# Patient Record
Sex: MALE | Race: WHITE | Employment: UNEMPLOYED | URBAN - METROPOLITAN AREA
[De-identification: names, ages, dates, MRNs, and addresses within clinical notes are randomized per-mention and may not be internally consistent; named-entity substitution may affect disease eponyms.]

---

## 2023-03-06 ENCOUNTER — APPOINTMENT (OUTPATIENT)
Dept: GENERAL RADIOLOGY | Age: 88
DRG: 558 | End: 2023-03-06
Payer: MEDICARE

## 2023-03-06 ENCOUNTER — APPOINTMENT (OUTPATIENT)
Dept: CT IMAGING | Age: 88
DRG: 558 | End: 2023-03-06
Payer: MEDICARE

## 2023-03-06 ENCOUNTER — HOSPITAL ENCOUNTER (INPATIENT)
Age: 88
LOS: 4 days | Discharge: NURSING, REHAB OR LONG-TERM CARE | DRG: 558 | End: 2023-03-10
Attending: EMERGENCY MEDICINE | Admitting: FAMILY MEDICINE
Payer: MEDICARE
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
## 2023-03-06 DIAGNOSIS — M62.82 NON-TRAUMATIC RHABDOMYOLYSIS: Primary | ICD-10-CM

## 2023-03-06 DIAGNOSIS — N30.01 ACUTE CYSTITIS WITH HEMATURIA: ICD-10-CM

## 2023-03-06 DIAGNOSIS — F03.90 DEMENTIA WITHOUT BEHAVIORAL DISTURBANCE, PSYCHOTIC DISTURBANCE, MOOD DISTURBANCE, OR ANXIETY, UNSPECIFIED DEMENTIA SEVERITY, UNSPECIFIED DEMENTIA TYPE (HCC): ICD-10-CM

## 2023-03-06 DIAGNOSIS — S70.01XA CONTUSION OF RIGHT HIP, INITIAL ENCOUNTER: ICD-10-CM

## 2023-03-06 LAB
ALBUMIN SERPL-MCNC: 3.8 G/DL (ref 3.5–5.2)
ALP BLD-CCNC: 94 U/L (ref 40–129)
ALT SERPL-CCNC: 76 U/L (ref 0–40)
ANION GAP SERPL CALCULATED.3IONS-SCNC: 15 MMOL/L (ref 7–16)
AST SERPL-CCNC: 209 U/L (ref 0–39)
BACTERIA: ABNORMAL /HPF
BASOPHILS ABSOLUTE: 0.03 E9/L (ref 0–0.2)
BASOPHILS RELATIVE PERCENT: 0.2 % (ref 0–2)
BILIRUB SERPL-MCNC: 0.8 MG/DL (ref 0–1.2)
BILIRUBIN URINE: ABNORMAL
BLOOD, URINE: ABNORMAL
BUN BLDV-MCNC: 47 MG/DL (ref 6–23)
CALCIUM SERPL-MCNC: 9.5 MG/DL (ref 8.6–10.2)
CHLORIDE BLD-SCNC: 105 MMOL/L (ref 98–107)
CLARITY: CLEAR
CO2: 24 MMOL/L (ref 22–29)
COLOR: YELLOW
CREAT SERPL-MCNC: 0.9 MG/DL (ref 0.7–1.2)
EKG ATRIAL RATE: 95 BPM
EKG P AXIS: 66 DEGREES
EKG P-R INTERVAL: 152 MS
EKG Q-T INTERVAL: 364 MS
EKG QRS DURATION: 100 MS
EKG QTC CALCULATION (BAZETT): 457 MS
EKG R AXIS: 29 DEGREES
EKG T AXIS: 85 DEGREES
EKG VENTRICULAR RATE: 95 BPM
EOSINOPHILS ABSOLUTE: 0 E9/L (ref 0.05–0.5)
EOSINOPHILS RELATIVE PERCENT: 0 % (ref 0–6)
GFR SERPL CREATININE-BSD FRML MDRD: >60 ML/MIN/1.73
GLUCOSE BLD-MCNC: 121 MG/DL (ref 74–99)
GLUCOSE URINE: NEGATIVE MG/DL
HCT VFR BLD CALC: 47.8 % (ref 37–54)
HEMOGLOBIN: 16.5 G/DL (ref 12.5–16.5)
IMMATURE GRANULOCYTES #: 0.09 E9/L
IMMATURE GRANULOCYTES %: 0.6 % (ref 0–5)
KETONES, URINE: >=80 MG/DL
LEUKOCYTE ESTERASE, URINE: NEGATIVE
LYMPHOCYTES ABSOLUTE: 0.87 E9/L (ref 1.5–4)
LYMPHOCYTES RELATIVE PERCENT: 5.7 % (ref 20–42)
MCH RBC QN AUTO: 31.4 PG (ref 26–35)
MCHC RBC AUTO-ENTMCNC: 34.5 % (ref 32–34.5)
MCV RBC AUTO: 91 FL (ref 80–99.9)
MONOCYTES ABSOLUTE: 0.86 E9/L (ref 0.1–0.95)
MONOCYTES RELATIVE PERCENT: 5.7 % (ref 2–12)
NEUTROPHILS ABSOLUTE: 13.29 E9/L (ref 1.8–7.3)
NEUTROPHILS RELATIVE PERCENT: 87.8 % (ref 43–80)
NITRITE, URINE: NEGATIVE
PDW BLD-RTO: 13.2 FL (ref 11.5–15)
PH UA: 6 (ref 5–9)
PLATELET # BLD: 216 E9/L (ref 130–450)
PMV BLD AUTO: 10.8 FL (ref 7–12)
POTASSIUM SERPL-SCNC: 4.5 MMOL/L (ref 3.5–5)
PROTEIN UA: 100 MG/DL
RBC # BLD: 5.25 E12/L (ref 3.8–5.8)
RBC UA: ABNORMAL /HPF (ref 0–2)
SODIUM BLD-SCNC: 144 MMOL/L (ref 132–146)
SPECIFIC GRAVITY UA: >=1.03 (ref 1–1.03)
TOTAL CK: 4446 U/L (ref 20–200)
TOTAL PROTEIN: 6.8 G/DL (ref 6.4–8.3)
UROBILINOGEN, URINE: 0.2 E.U./DL
WBC # BLD: 15.1 E9/L (ref 4.5–11.5)
WBC UA: ABNORMAL /HPF (ref 0–5)

## 2023-03-06 PROCEDURE — 1200000000 HC SEMI PRIVATE

## 2023-03-06 PROCEDURE — 72125 CT NECK SPINE W/O DYE: CPT

## 2023-03-06 PROCEDURE — 2580000003 HC RX 258: Performed by: FAMILY MEDICINE

## 2023-03-06 PROCEDURE — 85025 COMPLETE CBC W/AUTO DIFF WBC: CPT

## 2023-03-06 PROCEDURE — 72192 CT PELVIS W/O DYE: CPT

## 2023-03-06 PROCEDURE — 71045 X-RAY EXAM CHEST 1 VIEW: CPT

## 2023-03-06 PROCEDURE — 99285 EMERGENCY DEPT VISIT HI MDM: CPT

## 2023-03-06 PROCEDURE — 80053 COMPREHEN METABOLIC PANEL: CPT

## 2023-03-06 PROCEDURE — 82550 ASSAY OF CK (CPK): CPT

## 2023-03-06 PROCEDURE — 93005 ELECTROCARDIOGRAM TRACING: CPT | Performed by: EMERGENCY MEDICINE

## 2023-03-06 PROCEDURE — 70450 CT HEAD/BRAIN W/O DYE: CPT

## 2023-03-06 PROCEDURE — 81001 URINALYSIS AUTO W/SCOPE: CPT

## 2023-03-06 PROCEDURE — 93010 ELECTROCARDIOGRAM REPORT: CPT | Performed by: INTERNAL MEDICINE

## 2023-03-06 PROCEDURE — 73502 X-RAY EXAM HIP UNI 2-3 VIEWS: CPT

## 2023-03-06 PROCEDURE — 2580000003 HC RX 258: Performed by: EMERGENCY MEDICINE

## 2023-03-06 PROCEDURE — 87088 URINE BACTERIA CULTURE: CPT

## 2023-03-06 RX ORDER — SODIUM CHLORIDE 9 MG/ML
INJECTION, SOLUTION INTRAVENOUS PRN
Status: DISCONTINUED | OUTPATIENT
Start: 2023-03-06 | End: 2023-03-10 | Stop reason: HOSPADM

## 2023-03-06 RX ORDER — MAGNESIUM HYDROXIDE/ALUMINUM HYDROXICE/SIMETHICONE 120; 1200; 1200 MG/30ML; MG/30ML; MG/30ML
30 SUSPENSION ORAL EVERY 6 HOURS PRN
Status: DISCONTINUED | OUTPATIENT
Start: 2023-03-06 | End: 2023-03-10 | Stop reason: HOSPADM

## 2023-03-06 RX ORDER — ACETAMINOPHEN 650 MG/1
650 SUPPOSITORY RECTAL EVERY 6 HOURS PRN
Status: DISCONTINUED | OUTPATIENT
Start: 2023-03-06 | End: 2023-03-10 | Stop reason: HOSPADM

## 2023-03-06 RX ORDER — ONDANSETRON 2 MG/ML
4 INJECTION INTRAMUSCULAR; INTRAVENOUS EVERY 6 HOURS PRN
Status: DISCONTINUED | OUTPATIENT
Start: 2023-03-06 | End: 2023-03-10 | Stop reason: HOSPADM

## 2023-03-06 RX ORDER — SODIUM CHLORIDE 0.9 % (FLUSH) 0.9 %
5-40 SYRINGE (ML) INJECTION PRN
Status: DISCONTINUED | OUTPATIENT
Start: 2023-03-06 | End: 2023-03-10 | Stop reason: HOSPADM

## 2023-03-06 RX ORDER — 0.9 % SODIUM CHLORIDE 0.9 %
1000 INTRAVENOUS SOLUTION INTRAVENOUS ONCE
Status: COMPLETED | OUTPATIENT
Start: 2023-03-06 | End: 2023-03-06

## 2023-03-06 RX ORDER — POLYETHYLENE GLYCOL 3350 17 G/17G
17 POWDER, FOR SOLUTION ORAL DAILY PRN
Status: DISCONTINUED | OUTPATIENT
Start: 2023-03-06 | End: 2023-03-10 | Stop reason: HOSPADM

## 2023-03-06 RX ORDER — POTASSIUM CHLORIDE 7.45 MG/ML
10 INJECTION INTRAVENOUS PRN
Status: DISCONTINUED | OUTPATIENT
Start: 2023-03-06 | End: 2023-03-10 | Stop reason: HOSPADM

## 2023-03-06 RX ORDER — ENOXAPARIN SODIUM 100 MG/ML
40 INJECTION SUBCUTANEOUS DAILY
Status: DISCONTINUED | OUTPATIENT
Start: 2023-03-06 | End: 2023-03-10 | Stop reason: HOSPADM

## 2023-03-06 RX ORDER — POTASSIUM CHLORIDE 20 MEQ/1
40 TABLET, EXTENDED RELEASE ORAL PRN
Status: DISCONTINUED | OUTPATIENT
Start: 2023-03-06 | End: 2023-03-10 | Stop reason: HOSPADM

## 2023-03-06 RX ORDER — ONDANSETRON 4 MG/1
4 TABLET, ORALLY DISINTEGRATING ORAL EVERY 8 HOURS PRN
Status: DISCONTINUED | OUTPATIENT
Start: 2023-03-06 | End: 2023-03-10 | Stop reason: HOSPADM

## 2023-03-06 RX ORDER — SODIUM CHLORIDE 9 MG/ML
INJECTION, SOLUTION INTRAVENOUS CONTINUOUS
Status: DISCONTINUED | OUTPATIENT
Start: 2023-03-06 | End: 2023-03-10 | Stop reason: HOSPADM

## 2023-03-06 RX ORDER — ACETAMINOPHEN 325 MG/1
650 TABLET ORAL EVERY 6 HOURS PRN
Status: DISCONTINUED | OUTPATIENT
Start: 2023-03-06 | End: 2023-03-10 | Stop reason: HOSPADM

## 2023-03-06 RX ORDER — SODIUM CHLORIDE 0.9 % (FLUSH) 0.9 %
5-40 SYRINGE (ML) INJECTION EVERY 12 HOURS SCHEDULED
Status: DISCONTINUED | OUTPATIENT
Start: 2023-03-06 | End: 2023-03-10 | Stop reason: HOSPADM

## 2023-03-06 RX ADMIN — SODIUM CHLORIDE 1000 ML: 9 INJECTION, SOLUTION INTRAVENOUS at 17:18

## 2023-03-06 RX ADMIN — SODIUM CHLORIDE: 9 INJECTION, SOLUTION INTRAVENOUS at 22:06

## 2023-03-06 RX ADMIN — SODIUM CHLORIDE, PRESERVATIVE FREE 10 ML: 5 INJECTION INTRAVENOUS at 22:08

## 2023-03-06 ASSESSMENT — PAIN DESCRIPTION - PAIN TYPE: TYPE: ACUTE PAIN

## 2023-03-06 ASSESSMENT — PAIN DESCRIPTION - DESCRIPTORS: DESCRIPTORS: ACHING

## 2023-03-06 ASSESSMENT — PAIN - FUNCTIONAL ASSESSMENT: PAIN_FUNCTIONAL_ASSESSMENT: 0-10

## 2023-03-06 ASSESSMENT — PAIN DESCRIPTION - FREQUENCY: FREQUENCY: CONTINUOUS

## 2023-03-06 NOTE — LETTER
[address] Medicaid  CERTIFICATION OF NECESSITY  FOR TRANSPORTATION   BY WHEELCHAIR VAN     Individual Information   1. Name: Candido Vines 2. [place] Medicaid Billing Number:    3. Address: [facility] [address]  [address] [place] [address]      Transportation Provider Information   4. Provider Name: [facility] transport   5. [place] Medicaid Provider Number:  National Provider Identifier (NPI):      Certification  7. Criteria:  By signing this document, the practitioner certifies that two statements are true:  A. This individual must be accompanied by a mobility-related assistive device from the point of pick-up to the point of drop-off. B. Transport of this individual by standard passenger vehicle or common carrier is precluded or contraindicated. 8. Period Beginning Date: 3/10/23   9. Length  [x] Not more than1 day(s)  [] One Year     Additional Information Relevant to Certification   10. Comments or Explanations, If Necessary or Appropriate   [facility] to transport      Certifying Practitioner Information   11. Name of Practitioner: Dr. Danielle Ayala   12. [place] Medicaid Provider Number, If Applicable:  [address] Provider Identifier (NPI):      Signature Information   14. Date of Signature: 3/10/23 15. Name of [facility] [address]   16. Signature and Professional Designation: Electronically signed by STAS Stein on 3/10/2023 at 11:38 AM       OD 48[address]  Rev. 7/2015          [facility] Encounter Date/Time: 3/6/2023 Philippe Braun 75 Account: [de-identified]    MRN: [id number]    Patient: Candido Vines    Contact Serial #: [id number]      ENCOUNTER          Patient Class: I Private Enc?   No Unit  BD: ZPMJ 5WE [id number]   Hospital Service: Hospitalist   Encounter DX: Rhabdomyolysis [M62.82]   ADM Provider: Katarina Ramsay MD   Procedure:     ATT Provider: Danielle Ayala MD   REF Provider:        Admission DX: Rhabdomyolysis, Acute cystitis with hematuria, Contusion of right hip, initial encounter, Fall, initial encounter, Non-traumatic rhabdomyolysis and DX codes: M62.82, N30.01, S70.01XA, W19. Anaya Ham, Y74.93      PATIENT                 Name: Angy Rosa : 1932 (89 yrs)   Address: [address] [place] [address]  [address] Sex: Male   [place] city: [address] [place] [address]         Marital Status:    Employer: UNEMPLOYED         Taoist: None   Primary Care Provider: Elyssa Harrington MD         Primary Phone: [phone]   EMERGENCY CONTACT   Contact Name Legal Guardian? Relationship to Patient Home Phone Work Phone   1. Beth Rocha  2. None,None      Brother/Sister  Other [phone]                 GUARANTOR            Guarantor: Angy Rosa     : 1932   Address: [address] Sex: Male     [place] [address]     Relation to Patient: Self       Home Phone: [phone]   Guarantor ID: [id number]       Work Phone:     Guarantor Employer: UNEMPLOYED         Status: NOT EMPLO*      COVERAGE        PRIMARY INSURANCE   Payor: [facility] MEDICARE Plan: [facility] MEDICARE ADVANT*   Payor Address: ,          Group Number: [id number] Insurance Type: [address] Name: Hanna Lutz : 1932   Subscriber ID: [id number] Pat. Rel. to Sub: Self   SECONDARY INSURANCE   Payor:   Plan:     Payor Address:  ,           Group Number:   Insurance Type:     Subscriber Name:   Subscriber :     Subscriber ID:   Pat.  Rel. to Sub:

## 2023-03-06 NOTE — ED PROVIDER NOTES
HPI:  3/6/23, Time: 1:48 PM MAYDA Villeda is a 80 y.o. male presenting to the ED for fall occurring last night around 9 PM.  Patient's sister is at bedside helping to provide history. Patient does not remember how he fell. Apparently he laid on the floor all night, and he was found by his sister around noon. He lives alone. Patient is confused on my examination, and the sister states he is at his neurologic baseline. He has no specific complaints. No recent illness or fevers. Denying any headache, neck pain, back pain, chest pain abdominal pain, nausea, vomiting, or focal numbness or weakness. States he has some right side pain which is a chronic problem. Review of Systems:  Unable to obtain complete ROS due to confusion    --------------------------------------------- PAST HISTORY ---------------------------------------------  Past Medical History:  has no past medical history on file. Past Surgical History:  has a past surgical history that includes Cardiac surgery; joint replacement; and Total hip arthroplasty. Social History:  reports that he has never smoked. He does not have any smokeless tobacco history on file. He reports that he does not drink alcohol. Family History: family history is not on file. The patients home medications have been reviewed. Allergies: Patient has no known allergies.     -------------------------------------------------- RESULTS -------------------------------------------------  All laboratory and radiology results have been personally reviewed by myself   LABS:  Results for orders placed or performed during the hospital encounter of 03/06/23   CK   Result Value Ref Range    Total CK 4,446 (H) 20 - 200 U/L   Urinalysis with Microscopic   Result Value Ref Range    Color, UA Yellow Straw/Yellow    Clarity, UA Clear Clear    Glucose, Ur Negative Negative mg/dL    Bilirubin Urine MODERATE (A) Negative    Ketones, Urine >=80 (A) Negative mg/dL Specific Gravity, UA >=1.030 1.005 - 1.030    Blood, Urine MODERATE (A) Negative    pH, UA 6.0 5.0 - 9.0    Protein,  (A) Negative mg/dL    Urobilinogen, Urine 0.2 <2.0 E.U./dL    Nitrite, Urine Negative Negative    Leukocyte Esterase, Urine Negative Negative    WBC, UA 10-20 (A) 0 - 5 /HPF    RBC, UA 1-3 0 - 2 /HPF    Bacteria, UA MODERATE (A) None Seen /HPF   CBC with Auto Differential   Result Value Ref Range    WBC 15.1 (H) 4.5 - 11.5 E9/L    RBC 5.25 3.80 - 5.80 E12/L    Hemoglobin 16.5 12.5 - 16.5 g/dL    Hematocrit 47.8 37.0 - 54.0 %    MCV 91.0 80.0 - 99.9 fL    MCH 31.4 26.0 - 35.0 pg    MCHC 34.5 32.0 - 34.5 %    RDW 13.2 11.5 - 15.0 fL    Platelets 758 581 - 712 E9/L    MPV 10.8 7.0 - 12.0 fL    Neutrophils % 87.8 (H) 43.0 - 80.0 %    Immature Granulocytes % 0.6 0.0 - 5.0 %    Lymphocytes % 5.7 (L) 20.0 - 42.0 %    Monocytes % 5.7 2.0 - 12.0 %    Eosinophils % 0.0 0.0 - 6.0 %    Basophils % 0.2 0.0 - 2.0 %    Neutrophils Absolute 13.29 (H) 1.80 - 7.30 E9/L    Immature Granulocytes # 0.09 E9/L    Lymphocytes Absolute 0.87 (L) 1.50 - 4.00 E9/L    Monocytes Absolute 0.86 0.10 - 0.95 E9/L    Eosinophils Absolute 0.00 (L) 0.05 - 0.50 E9/L    Basophils Absolute 0.03 0.00 - 0.20 E9/L   CMP   Result Value Ref Range    Sodium 144 132 - 146 mmol/L    Potassium 4.5 3.5 - 5.0 mmol/L    Chloride 105 98 - 107 mmol/L    CO2 24 22 - 29 mmol/L    Anion Gap 15 7 - 16 mmol/L    Glucose 121 (H) 74 - 99 mg/dL    BUN 47 (H) 6 - 23 mg/dL    Creatinine 0.9 0.7 - 1.2 mg/dL    Est, Glom Filt Rate >60 >=60 mL/min/1.73    Calcium 9.5 8.6 - 10.2 mg/dL    Total Protein 6.8 6.4 - 8.3 g/dL    Albumin 3.8 3.5 - 5.2 g/dL    Total Bilirubin 0.8 0.0 - 1.2 mg/dL    Alkaline Phosphatase 94 40 - 129 U/L    ALT 76 (H) 0 - 40 U/L     (H) 0 - 39 U/L   EKG 12 Lead   Result Value Ref Range    Ventricular Rate 95 BPM    Atrial Rate 95 BPM    P-R Interval 152 ms    QRS Duration 100 ms    Q-T Interval 364 ms    QTc Calculation (Bazett) 457 ms    P Axis 66 degrees    R Axis 29 degrees    T Axis 85 degrees       RADIOLOGY:  Interpreted by Radiologist.  CT HEAD WO CONTRAST   Final Result   CT HEAD WITHOUT CONTRAST:      1. No skull fracture or acute intracranial abnormality. CT CERVICAL SPINE WITHOUT CONTRAST:      1. No fracture or joint dislocation is seen. 2. Degenerative changes, as described. CT CERVICAL SPINE WO CONTRAST   Final Result   CT HEAD WITHOUT CONTRAST:      1. No skull fracture or acute intracranial abnormality. CT CERVICAL SPINE WITHOUT CONTRAST:      1. No fracture or joint dislocation is seen. 2. Degenerative changes, as described. CT PELVIS WO CONTRAST Additional Contrast? None   Final Result   1. No acute abnormality seen in the pelvis. 2. Bilateral hip arthroplasties noted. 3. Innumerable diverticula in the urinary bladder. The bladder base and   prostate are obscured by beam hardening artifact from bilateral hip   arthroplasties. 4. Prominent distal colonic diverticulosis without diverticulitis. XR CHEST PORTABLE   Final Result   No acute process. XR HIP 2-3 VW W PELVIS RIGHT   Final Result   No acute bony abnormalities or prosthetic complications.             ------------------------- NURSING NOTES AND VITALS REVIEWED ---------------------------   The nursing notes within the ED encounter and vital signs as below have been reviewed. /80   Pulse (!) 108   Temp 97.3 °F (36.3 °C)   Resp 16   Ht 6' 2\" (1.88 m)   Wt 170 lb (77.1 kg)   SpO2 98%   BMI 21.83 kg/m²   Oxygen Saturation Interpretation: Normal      ---------------------------------------------------PHYSICAL EXAM--------------------------------------      PHYSICAL EXAM:  Vitals Reviewed  Constitutional/General: Alert and oriented x2, disheveled, non toxic in NAD. HEENT: Normocephalic and atraumatic. PERRL, EOMI. Oropharynx clear, handling secretions, no trismus. No raccoon eyes. No byers's sign.    Neck: Supple, full ROM, no cervical spine tenderness. Pulmonary: Lungs clear to auscultation bilaterally, no wheezes, rales, or rhonchi. Not in respiratory distress. Cardiovascular:  Regular rate and rhythm  Chest: No chest wall tenderness. No crepitus. Abdomen: Soft, non tender, non distended. Pelvis: Pelvis stable. Tenderness to right hip. Normal range of motion to hips bilaterally. Extremities: Moves all extremities x 4. Warm and well perfused. Skin: warm and dry without rash. Neurologic: GCS 15, 5/5 strength in all extremities. Normal sensation in all extremities. Psych: Normal Affect. Normal behavior.       ------------------------------ ED COURSE/MEDICAL DECISION MAKING----------------------  Medications   0.9 % sodium chloride infusion ( IntraVENous New Bag 3/6/23 2206)   sodium chloride flush 0.9 % injection 5-40 mL (10 mLs IntraVENous Given 3/6/23 2208)   sodium chloride flush 0.9 % injection 5-40 mL (has no administration in time range)   0.9 % sodium chloride infusion (has no administration in time range)   potassium chloride (KLOR-CON M) extended release tablet 40 mEq (has no administration in time range)     Or   potassium bicarb-citric acid (EFFER-K) effervescent tablet 40 mEq (has no administration in time range)     Or   potassium chloride 10 mEq/100 mL IVPB (Peripheral Line) (has no administration in time range)   enoxaparin (LOVENOX) injection 40 mg (40 mg SubCUTAneous Not Given 3/6/23 2208)   ondansetron (ZOFRAN-ODT) disintegrating tablet 4 mg (has no administration in time range)     Or   ondansetron (ZOFRAN) injection 4 mg (has no administration in time range)   polyethylene glycol (GLYCOLAX) packet 17 g (has no administration in time range)   acetaminophen (TYLENOL) tablet 650 mg (has no administration in time range)     Or   acetaminophen (TYLENOL) suppository 650 mg (has no administration in time range)   aluminum & magnesium hydroxide-simethicone (MAALOX) 200-200-20 MG/5ML suspension 30 mL (has no administration in time range)   cefTRIAXone (ROCEPHIN) 1,000 mg in sterile water 10 mL IV syringe (has no administration in time range)   0.9 % sodium chloride bolus (0 mLs IntraVENous Stopped 3/6/23 0262)       Medical Decision Making/ED COURSE:    History From: patient, sister     Patient is a 80 y.o. male presenting to the ED for acute onset fall, moderate in severity. In the ED, patient was hemodynamically stable and afebrile. On exam, patient was alert but confused, at his neurologic baseline per sister at bedside. Labs, head CT, cervical spine CT, chest x-ray, right pelvic x-ray with right hip obtained. Differential diagnosis includes intracranial hemorrhage, spinal fracture, hip fracture, rhabdomyolysis, UTI. I reviewed and interpreted labs. CBC showed a leukocytosis of 15.1. No anemia. CMP was reassuring with normal kidney function and normal electrolytes. He had minimal elevation in LFTs but no abdominal tenderness. CPK was elevated at 4446. Urinalysis appears infected. Urine culture was sent. IV Rocephin ordered. Chest and right hip/pelvis xrays interpreted by me. Interpretation-lungs clear, no infiltrate, no hip fracture. Radiologist confirms read. Head CT, cervical spine CT, and pelvic CT showed no acute traumatic abnormalities. No intracranial hemorrhage, spinal fracture, or pelvic fractures. Patient requires admission for rhabdomyolysis and ambulatory dysfunction. CONSULTS: (Who and What was discussed)  [facility]    Social Determinants of Health : None    Chronic Conditions affecting care:    has no past medical history on file.      Medical Decision Making  Problems Addressed:  Acute cystitis with hematuria: acute illness or injury  Contusion of right hip, initial encounter: acute illness or injury  Fall, initial encounter: acute illness or injury  Non-traumatic rhabdomyolysis: acute illness or injury    Amount and/or Complexity of Data Reviewed  Independent Historian: caregiver and EMS  External Data Reviewed: ECG. Labs: ordered. Decision-making details documented in ED Course. Radiology: ordered and independent interpretation performed. Decision-making details documented in ED Course. ECG/medicine tests: ordered and independent interpretation performed. Decision-making details documented in ED Course. Risk  Decision regarding hospitalization. ED Course as of 03/06/23 2308   Mon Mar 06, 2023   1433 EKG: This EKG is signed and interpreted by Autumn silver MD.    Rate: 95  Rhythm: Sinus  Interpretation: Normal sinus rhythm, normal MA interval, normal QTc, ST depressions in anterior and lateral leads appear new  Comparison: changes compared to previous EKG   [JA]   6796 Hospitalist was consulted. [facility] accepted the patient for admission. [JA]      ED Course User Index  [JA] Autumn Barroso MD       Patient remained hemodynamically stable throughout ED course. New Prescriptions    No medications on file         Counseling: The emergency provider has spoken with the patient and sister and discussed todays results, in addition to providing specific details for the plan of care and counseling regarding the diagnosis and prognosis. Questions are answered at this time and they are agreeable with the plan.      --------------------------------- IMPRESSION AND DISPOSITION ---------------------------------    IMPRESSION  1. Non-traumatic rhabdomyolysis    2. Fall, initial encounter    3. Acute cystitis with hematuria    4. Contusion of right hip, initial encounter        DISPOSITION  Disposition: Admit to telemetry  Patient condition is stable      NOTE: This report was transcribed using voice recognition software.  Every effort was made to ensure accuracy; however, inadvertent computerized transcription errors may be present    IAutumn MD, am the primary provider of this record       Kyle Ruiz MD  03/06/23 2599

## 2023-03-07 LAB
ALBUMIN SERPL-MCNC: 3 G/DL (ref 3.5–5.2)
ALP BLD-CCNC: 73 U/L (ref 40–129)
ALT SERPL-CCNC: 71 U/L (ref 0–40)
ANION GAP SERPL CALCULATED.3IONS-SCNC: 15 MMOL/L (ref 7–16)
AST SERPL-CCNC: 171 U/L (ref 0–39)
BASOPHILS ABSOLUTE: 0.02 E9/L (ref 0–0.2)
BASOPHILS RELATIVE PERCENT: 0.2 % (ref 0–2)
BILIRUB SERPL-MCNC: 0.9 MG/DL (ref 0–1.2)
BUN BLDV-MCNC: 49 MG/DL (ref 6–23)
CALCIUM SERPL-MCNC: 8.5 MG/DL (ref 8.6–10.2)
CHLORIDE BLD-SCNC: 112 MMOL/L (ref 98–107)
CO2: 19 MMOL/L (ref 22–29)
CREAT SERPL-MCNC: 1.1 MG/DL (ref 0.7–1.2)
EOSINOPHILS ABSOLUTE: 0.02 E9/L (ref 0.05–0.5)
EOSINOPHILS RELATIVE PERCENT: 0.2 % (ref 0–6)
GFR SERPL CREATININE-BSD FRML MDRD: >60 ML/MIN/1.73
GLUCOSE BLD-MCNC: 100 MG/DL (ref 74–99)
HCT VFR BLD CALC: 44.1 % (ref 37–54)
HEMOGLOBIN: 14.7 G/DL (ref 12.5–16.5)
IMMATURE GRANULOCYTES #: 0.05 E9/L
IMMATURE GRANULOCYTES %: 0.4 % (ref 0–5)
LYMPHOCYTES ABSOLUTE: 1.08 E9/L (ref 1.5–4)
LYMPHOCYTES RELATIVE PERCENT: 9.3 % (ref 20–42)
MAGNESIUM: 2.3 MG/DL (ref 1.6–2.6)
MCH RBC QN AUTO: 31.7 PG (ref 26–35)
MCHC RBC AUTO-ENTMCNC: 33.3 % (ref 32–34.5)
MCV RBC AUTO: 95 FL (ref 80–99.9)
MONOCYTES ABSOLUTE: 0.79 E9/L (ref 0.1–0.95)
MONOCYTES RELATIVE PERCENT: 6.8 % (ref 2–12)
NEUTROPHILS ABSOLUTE: 9.71 E9/L (ref 1.8–7.3)
NEUTROPHILS RELATIVE PERCENT: 83.1 % (ref 43–80)
PDW BLD-RTO: 13.2 FL (ref 11.5–15)
PLATELET # BLD: 182 E9/L (ref 130–450)
PMV BLD AUTO: 11 FL (ref 7–12)
POTASSIUM SERPL-SCNC: 4 MMOL/L (ref 3.5–5)
RBC # BLD: 4.64 E12/L (ref 3.8–5.8)
SODIUM BLD-SCNC: 146 MMOL/L (ref 132–146)
TOTAL CK: 3625 U/L (ref 20–200)
TOTAL CK: 3872 U/L (ref 20–200)
TOTAL PROTEIN: 5.9 G/DL (ref 6.4–8.3)
TROPONIN, HIGH SENSITIVITY: 78 NG/L (ref 0–11)
WBC # BLD: 11.7 E9/L (ref 4.5–11.5)

## 2023-03-07 PROCEDURE — 6360000002 HC RX W HCPCS: Performed by: EMERGENCY MEDICINE

## 2023-03-07 PROCEDURE — 82550 ASSAY OF CK (CPK): CPT

## 2023-03-07 PROCEDURE — 2580000003 HC RX 258: Performed by: FAMILY MEDICINE

## 2023-03-07 PROCEDURE — 80053 COMPREHEN METABOLIC PANEL: CPT

## 2023-03-07 PROCEDURE — 2580000003 HC RX 258: Performed by: EMERGENCY MEDICINE

## 2023-03-07 PROCEDURE — 36415 COLL VENOUS BLD VENIPUNCTURE: CPT

## 2023-03-07 PROCEDURE — 84484 ASSAY OF TROPONIN QUANT: CPT

## 2023-03-07 PROCEDURE — 85025 COMPLETE CBC W/AUTO DIFF WBC: CPT

## 2023-03-07 PROCEDURE — 6360000002 HC RX W HCPCS: Performed by: FAMILY MEDICINE

## 2023-03-07 PROCEDURE — 83735 ASSAY OF MAGNESIUM: CPT

## 2023-03-07 PROCEDURE — 1200000000 HC SEMI PRIVATE

## 2023-03-07 PROCEDURE — 6370000000 HC RX 637 (ALT 250 FOR IP): Performed by: FAMILY MEDICINE

## 2023-03-07 RX ADMIN — CEFTRIAXONE SODIUM 1000 MG: 1 INJECTION, POWDER, FOR SOLUTION INTRAMUSCULAR; INTRAVENOUS at 00:27

## 2023-03-07 RX ADMIN — ACETAMINOPHEN 650 MG: 325 TABLET ORAL at 21:06

## 2023-03-07 RX ADMIN — ENOXAPARIN SODIUM 40 MG: 100 INJECTION SUBCUTANEOUS at 08:50

## 2023-03-07 RX ADMIN — SODIUM CHLORIDE, PRESERVATIVE FREE 10 ML: 5 INJECTION INTRAVENOUS at 11:54

## 2023-03-07 ASSESSMENT — PAIN SCALES - GENERAL: PAINLEVEL_OUTOF10: 6

## 2023-03-07 ASSESSMENT — PAIN DESCRIPTION - DESCRIPTORS: DESCRIPTORS: ACHING

## 2023-03-07 ASSESSMENT — PAIN DESCRIPTION - LOCATION: LOCATION: BACK

## 2023-03-07 NOTE — ED NOTES
Patient found by this RN with monitor leads, BP cuff and pulse ox removed and patient attempting to get out of bed. This RN and Nikia aTte pulled the patient up in bed. This RN reconnected the patient to the monitor. This patient stated \"what am I doing here? \" This RN reoriented the patient at this time  and educated him on the purpose of tracking vitals signs.      Lory Jeter RN  03/06/23 4420

## 2023-03-07 NOTE — ED NOTES
Patient checked and changed for transport to assigned bed/being cleaned.      Giulia Holley RN  03/07/23 6082

## 2023-03-07 NOTE — CARE COORDINATION
Social Work Planning:  SW met with patient and daughter Wilfred Greco at the bedside for initial assessment. Patient lives alone in a 2 story condo. Prior to admission patient was independent with ADLs. Patient has mo hx with HHC or SNF. Patient doesn't have a PCP, will need an appt prior to discharge. Malaika relayed the family plan after discharge is to move the patient to [facility], the family doesn't want the patient to know. RACHANA will await PT/OT evgene to assist with transition of care. Electronically signed by STAS Weber on 3/7/2023 at 7:31 PM

## 2023-03-07 NOTE — H&P
Hospital Medicine History & Physical      PCP: No primary care provider on file. Date of Admission: 3/6/2023    Date of Service: Pt seen/examined on 3/6/2023  and Admitted to Inpatient with expected LOS greater than two midnights due to medical therapy. Chief Complaint:  Fall       History Of Present Illness:    80 y.o. male with  no significant past medical history . Patient is seen in there ED after a fall. Patient has a history of dementia. Patient fell last night  according to sister  and laid on the floor all night  . He was found by sister at 12 noon today . Patient lives alone . He reports no fever chills chest pain nausea vomiting  shortness of breath. In the ED  patient was hemodynamically stable   Lab data  reveal sodium 144 potassium 4.5 BUN 47 SCr 0.9  CK 4446 ALT 76   WBC 15.1 HGB 16.5   UA mod bili ketones moderate blood  WBC moderate bacteria  Ct head  neg CT cervical spine no fracture  CT Pelvis neg  CXR  neg  XR hip neg  EKG NSR . Patient will be admitted for further evaluation     Past Medical History:      No past medical history on file. Past Surgical History:          Procedure Laterality Date    CARDIAC SURGERY      JOINT REPLACEMENT      TOTAL HIP ARTHROPLASTY         Medications Prior to Admission:      Prior to Admission medications    Not on File       Allergies:  Patient has no known allergies. Social History:      The patient currently lives with family     TOBACCO:   reports that he has never smoked. He does not have any smokeless tobacco history on file. ETOH:   reports no history of alcohol use. Family History:       Reviewed in detail and negative for DM, CAD, Cancer, CVA. Positive as follows:    No family history on file. REVIEW OF SYSTEMS:   Pertinent positives as noted in the HPI. All other systems reviewed and negative.     PHYSICAL EXAM:    /77   Pulse 98   Temp 97.3 °F (36.3 °C)   Resp 18   Wt 170 lb (77.1 kg)   SpO2 99% BMI 21.83 kg/m²     General appearance:  No apparent distress, appears stated age and cooperative. HEENT:  Normal cephalic, atraumatic without obvious deformity. Pupils equal, round, and reactive to light. Extra ocular muscles intact. Conjunctivae/corneas clear. Neck: Supple, with full range of motion. No jugular venous distention. Trachea midline. Respiratory:  Normal respiratory effort. Clear to auscultation, bilaterally without Rales/Wheezes/Rhonchi. Cardiovascular:  Regular rate and rhythm with normal S1/S2 without murmurs, rubs or gallops. Abdomen: Soft, non-tender, non-distended with normal bowel sounds. Musculoskeletal:  No clubbing, cyanosis or edema bilaterally. Full range of motion without deformity. Skin: Skin color, texture, turgor normal.  No rashes or lesions. Neurologic:  Neurovascularly intact without any focal sensory/motor deficits. Cranial nerves: II-XII intact, grossly non-focal.  Psychiatric:  Alert and oriented, thought content appropriate, normal insight    CXR:  I have reviewed the CXR   EKG:  I have reviewed the EKG     Labs:     Recent Labs     03/06/23  1421   WBC 15.1*   HGB 16.5   HCT 47.8        Recent Labs     03/06/23  1421      K 4.5      CO2 24   BUN 47*   CREATININE 0.9   CALCIUM 9.5     Recent Labs     03/06/23  1421   *   ALT 76*   BILITOT 0.8   ALKPHOS 94     No results for input(s): INR in the last 72 hours.   Recent Labs     03/06/23  1421   CKTOTAL 4,446*       Urinalysis:      Lab Results   Component Value Date/Time    NITRU Negative 03/06/2023 07:02 PM    WBCUA 10-20 03/06/2023 07:02 PM    BACTERIA MODERATE 03/06/2023 07:02 PM    RBCUA 1-3 03/06/2023 07:02 PM    BLOODU MODERATE 03/06/2023 07:02 PM    SPECGRAV >=1.030 03/06/2023 07:02 PM    GLUCOSEU Negative 03/06/2023 07:02 PM         ASSESSMENT:    Active Hospital Problems    Diagnosis Date Noted    Rhabdomyolysis [M62.82] 03/06/2023     Priority: Medium       PLAN:    Acute rhabdomyolysis :      patient fell at home and was ion the floor for an unknown amount of time. Patient has dementia and lives alone  CK   4446 Scr 0.9   Admit inpatient vitals telemetry trend CK IVF     Fall : CT head neg Ct cervical spine no fracture   Ct pelvis  bilateral hip arthoplasties noted  CXR neg  XR right pelvis neg . Fall precaution PT/OT     Transamintitis : ALT 76 ,   sec to rhabdo             DVT Prophylaxis: Lovenox   Diet: ADULT DIET;  Regular  Code Status: Full Code    PT/OT Eval Status: ordered     Nneka Shen MD

## 2023-03-07 NOTE — ED NOTES
Patient found undressed and questioning where he is. Patient dressed and reoriented at this time.       Duane Parekh RN  03/07/23 8057

## 2023-03-07 NOTE — ED NOTES
Attempted to call report, but placed on hold> 5 minutes. Receiving unit may contact ED after chart review if further info needed.      Osmel Casillas RN  03/07/23 2805       Osmel Casillas RN  03/07/23 6250

## 2023-03-07 NOTE — ED NOTES
Patient needing reoriented each time this RN is in the room. This patient attempted to get out of bed and this RN reoriented this patient and reminded him that he is in the hospital and that he should remain in bed and keep the monitor on so that we can track his vital signs while here.      Ken Clements RN  03/06/23 3167

## 2023-03-07 NOTE — ED NOTES
Patient found with blood on his left arm. When this RN asked what happened, the patient stated, \"Oh, I just took this thing out because I don't need it anymore\" IV found on the bed with catheter intact. This RN educated the patient on the importance of IV access and that he should not take them out. Patient cleaned up at this time. IV in right arm wrapped so patient does not do this again.      Madhuri Tanner RN  03/06/23 2040

## 2023-03-07 NOTE — ED NOTES
Patient found sitting at the edge of the bed, unsure of where he is and why and monitor leads ripped off. This RN and Saige Brandt RN pulled patient back up into bed, replaced leads, fixed blankets and reoriented patient at this time.       Madi Miller RN  03/07/23 7797

## 2023-03-08 LAB
ALBUMIN SERPL-MCNC: 2.6 G/DL (ref 3.5–5.2)
ALP BLD-CCNC: 63 U/L (ref 40–129)
ALT SERPL-CCNC: 64 U/L (ref 0–40)
ANION GAP SERPL CALCULATED.3IONS-SCNC: 9 MMOL/L (ref 7–16)
AST SERPL-CCNC: 113 U/L (ref 0–39)
BASOPHILS ABSOLUTE: 0.03 E9/L (ref 0–0.2)
BASOPHILS RELATIVE PERCENT: 0.4 % (ref 0–2)
BILIRUB SERPL-MCNC: 0.9 MG/DL (ref 0–1.2)
BUN BLDV-MCNC: 32 MG/DL (ref 6–23)
CALCIUM SERPL-MCNC: 8 MG/DL (ref 8.6–10.2)
CHLORIDE BLD-SCNC: 107 MMOL/L (ref 98–107)
CO2: 22 MMOL/L (ref 22–29)
CREAT SERPL-MCNC: 0.7 MG/DL (ref 0.7–1.2)
EOSINOPHILS ABSOLUTE: 0.17 E9/L (ref 0.05–0.5)
EOSINOPHILS RELATIVE PERCENT: 2.2 % (ref 0–6)
GFR SERPL CREATININE-BSD FRML MDRD: >60 ML/MIN/1.73
GLUCOSE BLD-MCNC: 105 MG/DL (ref 74–99)
HCT VFR BLD CALC: 41.8 % (ref 37–54)
HEMOGLOBIN: 13.7 G/DL (ref 12.5–16.5)
IMMATURE GRANULOCYTES #: 0.02 E9/L
IMMATURE GRANULOCYTES %: 0.3 % (ref 0–5)
LYMPHOCYTES ABSOLUTE: 0.98 E9/L (ref 1.5–4)
LYMPHOCYTES RELATIVE PERCENT: 12.8 % (ref 20–42)
MAGNESIUM: 2.1 MG/DL (ref 1.6–2.6)
MCH RBC QN AUTO: 31.4 PG (ref 26–35)
MCHC RBC AUTO-ENTMCNC: 32.8 % (ref 32–34.5)
MCV RBC AUTO: 95.9 FL (ref 80–99.9)
MONOCYTES ABSOLUTE: 0.48 E9/L (ref 0.1–0.95)
MONOCYTES RELATIVE PERCENT: 6.3 % (ref 2–12)
NEUTROPHILS ABSOLUTE: 5.97 E9/L (ref 1.8–7.3)
NEUTROPHILS RELATIVE PERCENT: 78 % (ref 43–80)
PDW BLD-RTO: 12.9 FL (ref 11.5–15)
PHOSPHORUS: 2.5 MG/DL (ref 2.5–4.5)
PLATELET # BLD: 153 E9/L (ref 130–450)
PMV BLD AUTO: 10.7 FL (ref 7–12)
POTASSIUM SERPL-SCNC: 3.4 MMOL/L (ref 3.5–5)
RBC # BLD: 4.36 E12/L (ref 3.8–5.8)
SODIUM BLD-SCNC: 138 MMOL/L (ref 132–146)
TOTAL CK: 1785 U/L (ref 20–200)
TOTAL PROTEIN: 5 G/DL (ref 6.4–8.3)
WBC # BLD: 7.7 E9/L (ref 4.5–11.5)

## 2023-03-08 PROCEDURE — 82550 ASSAY OF CK (CPK): CPT

## 2023-03-08 PROCEDURE — 80053 COMPREHEN METABOLIC PANEL: CPT

## 2023-03-08 PROCEDURE — 97535 SELF CARE MNGMENT TRAINING: CPT

## 2023-03-08 PROCEDURE — 2580000003 HC RX 258: Performed by: INTERNAL MEDICINE

## 2023-03-08 PROCEDURE — 97165 OT EVAL LOW COMPLEX 30 MIN: CPT

## 2023-03-08 PROCEDURE — 97530 THERAPEUTIC ACTIVITIES: CPT

## 2023-03-08 PROCEDURE — 97161 PT EVAL LOW COMPLEX 20 MIN: CPT

## 2023-03-08 PROCEDURE — 36415 COLL VENOUS BLD VENIPUNCTURE: CPT

## 2023-03-08 PROCEDURE — 83735 ASSAY OF MAGNESIUM: CPT

## 2023-03-08 PROCEDURE — 6360000002 HC RX W HCPCS: Performed by: FAMILY MEDICINE

## 2023-03-08 PROCEDURE — 2580000003 HC RX 258: Performed by: STUDENT IN AN ORGANIZED HEALTH CARE EDUCATION/TRAINING PROGRAM

## 2023-03-08 PROCEDURE — 6370000000 HC RX 637 (ALT 250 FOR IP): Performed by: STUDENT IN AN ORGANIZED HEALTH CARE EDUCATION/TRAINING PROGRAM

## 2023-03-08 PROCEDURE — 2500000003 HC RX 250 WO HCPCS: Performed by: INTERNAL MEDICINE

## 2023-03-08 PROCEDURE — 1200000000 HC SEMI PRIVATE

## 2023-03-08 PROCEDURE — 85025 COMPLETE CBC W/AUTO DIFF WBC: CPT

## 2023-03-08 PROCEDURE — 84100 ASSAY OF PHOSPHORUS: CPT

## 2023-03-08 RX ORDER — POTASSIUM CHLORIDE 20 MEQ/1
40 TABLET, EXTENDED RELEASE ORAL ONCE
Status: COMPLETED | OUTPATIENT
Start: 2023-03-08 | End: 2023-03-08

## 2023-03-08 RX ADMIN — SODIUM CHLORIDE: 9 INJECTION, SOLUTION INTRAVENOUS at 12:03

## 2023-03-08 RX ADMIN — ENOXAPARIN SODIUM 40 MG: 100 INJECTION SUBCUTANEOUS at 09:21

## 2023-03-08 RX ADMIN — POTASSIUM PHOSPHATE, MONOBASIC AND POTASSIUM PHOSPHATE, DIBASIC 20 MMOL: 224; 236 INJECTION, SOLUTION, CONCENTRATE INTRAVENOUS at 13:52

## 2023-03-08 RX ADMIN — POTASSIUM CHLORIDE 40 MEQ: 1500 TABLET, EXTENDED RELEASE ORAL at 12:01

## 2023-03-08 NOTE — PROGRESS NOTES
Internal Medicine Progress Note        Subjective  Patient seen and examined at bedside today morning. Patient resting comfortably. Patient denies any pain. Patient unable to recall reason for fall. Clinically improving. Feeling better. Stable overnight. No other overnight issues reported. No CP, SOB, palpitations, blurred vision, HA, lightheadedness, LOC or focal neurological deficits    Exam:  BP (!) 124/58   Pulse 72   Temp 97.8 °F (36.6 °C) (Temporal)   Resp 18   Ht 6' 2\" (1.88 m)   Wt 170 lb (77.1 kg)   SpO2 95%   BMI 21.83 kg/m²   General appearance: No apparent distress, appears stated age and cooperative. HEENT: Pupils equal, round, and reactive to light. Conjunctivae/corneas clear. Neck: Supple. No jugular venous distention. Trachea midline. Respiratory:  Normal respiratory effort. Clear to auscultation, bilaterally without Rales/Wheezes/Rhonchi. Cardiovascular: Regular rate and rhythm with normal S1/S2 without murmurs, rubs or gallops. Abdomen: Soft, non-tender, non-distended with normal bowel sounds. Musculoskeletal: No clubbing, cyanosis or edema bilaterally. Brisk capillary refill. 2+ lower extremity pulses (dorsalis pedis).    Skin:  No rashes    Neurologic: awake, alert and following commands     Medications:  Reviewed    Infusion Medications    sodium chloride 125 mL/hr at 03/07/23 1154    sodium chloride       Scheduled Medications    potassium chloride  40 mEq Oral Once    potassium phosphate IVPB  20 mmol IntraVENous Once    sodium chloride flush  5-40 mL IntraVENous 2 times per day    enoxaparin  40 mg SubCUTAneous Daily     PRN Meds: sodium chloride flush, sodium chloride, potassium chloride **OR** potassium alternative oral replacement **OR** potassium chloride, ondansetron **OR** ondansetron, polyethylene glycol, acetaminophen **OR** acetaminophen, aluminum & magnesium hydroxide-simethicone    I/O    Intake/Output Summary (Last 24 hours) at 3/8/2023 1104  Last data filed at 3/8/2023 0202  Gross per 24 hour   Intake 240 ml   Output 550 ml   Net -310 ml       Labs:   Recent Labs     03/06/23  1421 03/07/23  0606 03/08/23  0738   WBC 15.1* 11.7* 7.7   HGB 16.5 14.7 13.7   HCT 47.8 44.1 41.8    182 153       Recent Labs     03/06/23  1421 03/07/23  0606 03/08/23  0738    146 138   K 4.5 4.0 3.4*    112* 107   CO2 24 19* 22   BUN 47* 49* 32*   CREATININE 0.9 1.1 0.7   CALCIUM 9.5 8.5* 8.0*   PHOS  --   --  2.5       Recent Labs     03/06/23  1421 03/07/23  0606 03/08/23  0738   PROT 6.8 5.9* 5.0*   ALKPHOS 94 73 63   ALT 76* 71* 64*   * 171* 113*   BILITOT 0.8 0.9 0.9       No results for input(s): INR in the last 72 hours. Recent Labs     03/07/23  0606 03/07/23  1328 03/08/23  0738   CKTOTAL 3,872* 3,625* 1,785*       Chronic labs:  Lab Results   Component Value Date    TSH 1.630 05/18/2017       Radiology:  Imaging studies reviewed today. ASSESSMENT:  Rhabdomyolysis  Status post fall  Transaminitis    PLAN:  Continue with IV fluids at 125 cc an hour, no evidence of volume overload  CT head negative  CT cervical spine shows no fracture  CT pelvis bilateral hip arthroplasties noted  Chest x-ray negative  X-ray right pelvis negative  Fall precautions  We will trend LFTs        Diet: ADULT DIET; Regular  Code Status: Full Code  PT/OT Eval Status:     DVT Prophylaxis:   Lovenox  Recommended disposition at discharge:      +++++++++++++++++++++++++++++++++++++++++++++++++  Margarito Bundy MD   [facility].  +++++++++++++++++++++++++++++++++++++++++++++++++  NOTE: This report was transcribed using voice recognition software. Every effort was made to ensure accuracy; however, inadvertent computerized transcription errors may be present.

## 2023-03-08 NOTE — DISCHARGE INSTR - COC
Continuity of Care Form    Patient Name: Pankaj Jacome   :  1932  MRN:  [id number]    [address] [place] [address] date:  3/6/2023  Discharge date:  3/10/23    Code Status Order: Full Code   Advance Directives:     Admitting Physician:  Yas Pulido MD  PCP: Dalton Cummings MD    Discharging Nurse: [facility] Unit/Room#: 2264/4894-G  Discharging Unit Phone Number: [phone]    Emergency Contact:   Extended Emergency Contact Information  Primary Emergency Contact: [address] [facility] Phone: [phone]  Relation: Brother/Sister  Secondary Emergency Contact: None,None  Relation: Other    Past Surgical History:  Past Surgical History:   Procedure Laterality Date    CARDIAC SURGERY      JOINT REPLACEMENT      TOTAL HIP ARTHROPLASTY         Immunization History: There is no immunization history on file for this patient. Active Problems:  Patient Active Problem List   Diagnosis Code    Rhabdomyolysis M62.82       Isolation/Infection:   Isolation            No Isolation          Patient Infection Status       None to display            Nurse Assessment:  Last Vital Signs: BP (!) 124/58   Pulse 72   Temp 97.8 °F (36.6 °C) (Temporal)   Resp 18   Ht 6' 2\" (1.88 m)   Wt 170 lb (77.1 kg)   SpO2 95%   BMI 21.83 kg/m²     Last documented pain score (0-10 scale): Pain Level: 6  Last Weight:   Wt Readings from Last 1 Encounters:   23 170 lb (77.1 kg)     Mental Status:  disoriented, alert, and able to concentrate and follow conversation    IV Access:  - None    Nursing Mobility/ADLs:  Walking   Assisted  Transfer  Assisted  Bathing  Assisted  Dressing  Assisted  Toileting  Assisted  Feeding  Assisted  Med Admin  Assisted  Med Delivery   whole    Wound Care Documentation and Therapy:        Elimination:  Continence:    Bowel: Yes  Bladder: No  Urinary Catheter: None   Colostomy/Ileostomy/Ileal Conduit: No       Date of Last BM: 3/7/23 pt is unsure     Intake/Output Summary (Last 24 hours) at 3/8/2023 [address] filed at 3/8/2023 0202  Gross per 24 hour   Intake 240 ml   Output 550 ml   Net -310 ml     I/O last 3 completed shifts: In: 240 [P.O.:240]  Out: 550 [Urine:550]    Safety Concerns: At Risk for Falls    Impairments/Disabilities:      Hearing    Nutrition Therapy:  Current Nutrition Therapy:   - Oral Diet:  General    Routes of Feeding: Oral  Liquids: No Restrictions  Daily Fluid Restriction: no  Last Modified Barium Swallow with Video (Video Swallowing Test): not done    Treatments at the Time of Hospital Discharge:   Respiratory Treatments:   Oxygen Therapy:  is not on home oxygen therapy.   Ventilator:    - No ventilator support    Rehab Therapies: Physical Therapy and Occupational Therapy  Weight Bearing Status/Restrictions: No weight bearing restrictions  Other Medical Equipment (for information only, NOT a DME order):  walker and bedside commode  Other Treatments: ointment to coccyx     Patient's personal belongings (please select all that are sent with patient):  None    RN SIGNATURE:  Electronically signed by Johnny Black RN on 3/10/23 at 11:54 AM EST    CASE MANAGEMENT/SOCIAL WORK SECTION    Inpatient Status Date: 3/6/23    Readmission Risk Assessment Score:  Readmission Risk              Risk of Unplanned Readmission:  12           Discharging to Facility/ Agency   Name: [facility]  Address:  [place] [address]  Phone:  [phone]  Fax:  [phone]    Discharging to Facility/ Agency   Name: [facility]  Address:  [address] [place] [address]. Adriana Goode [address], [address]  Phone:  [phone]  Fax:    [phone]:  Wednesday [facility], 15, 2023 AT 3:20PM    / signature: Electronically signed by STAS Soto on 3/8/2023 at 1:18 PM      PHYSICIAN SECTION    Prognosis: {Prognosis:9265560603}    Condition at Discharge: [address] Patient Condition:494429748}    Rehab Potential (if transferring to Rehab): {Prognosis:5573571441}    Recommended Labs or Other Treatments After Discharge: ***    Physician Certification: I certify the above information and transfer of Marcelle Vasques  is necessary for the continuing treatment of the diagnosis listed and that he requires {Admit to Appropriate Level of Care:78424} for {GREATER/LESS:492655795} 30 days.      Update Admission H&P: {CHP DME Changes in SBAVN:172365864}    PHYSICIAN SIGNATURE:  Herson Little

## 2023-03-08 NOTE — PROGRESS NOTES
Patient refusing to let RN take VS at this time. Patient oriented to being in the hospital.  Patient states he just had VS done not that long ago and he is fine. RN explained that it is a new shift and this is what we do for all the patients. Patient continues to refuse VS.  Will attempt again shortly.

## 2023-03-08 NOTE — CARE COORDINATION
Social Work/Case Management Transition of Care Planning (Shruthi Sivakumar [place] [phone]): Per report, CT of head, cervial spine and pelvis were negative for any acute process. CXR was also negative for any acute process. CK is 3625. Per attending, plan is to trend LFTs. Patient is on room air. PT/OT pending. Discharge plane is to [facility]. Family does not want patient to be told about jail plans. Appointment scheduled for 3/15 at 3:20pm with [facility] as patient does not have a PCP. CM/SW will follow. STAS Shen  3/8/2023    Update:  Met with patient and his daughter, Montez Fernandes. Patient is oriented to self and his daughter but stated he does not remember anything else. Discussed PT score and need for DESTINY. Daughter is in agreement. Provided her with a list of [address] options. Requested she pick top 3 choices and let SW know. Daughter expressed understanding. CM/SW will follow. STAS Shen  3/8/2023      Update:  Daughter provided list of top 3 DESTINY choices 1. [facility] 2. [facility]  3. [facility]. Referral information sent to [facility]. Await response. STAS Shen  3/8/2023    Update:  Per Christina Stephens at [facility]. She does not have any male beds. Referral information sent to [facility]. Await response. STAS Shen  3/8/2023      The Plan for Transition of Care is related to the following treatment goals: DESTINY    The Patient and/or patient representative  was provided with a choice of provider and agrees with the discharge plan. [x] Yes [] No    Freedom of choice list was provided with basic dialogue that supports the patient's individualized plan of care/goals, treatment preferences and shares the quality data associated with the providers.  [x] Yes [] No

## 2023-03-08 NOTE — PROGRESS NOTES
[address]        Date:3/8/2023                                                  Patient Name: Arlene Longoria    MRN: [id number]    : 1932    Room: [address] [place] [address]      Evaluating OT: Jaswinder Garciagal,  Ru Roger Marrufo Ras OTR/L; [id number]      Referring Provider: Kala Benitez MD    Specific Provider Orders/Date: OT Eval and Treat 3/6/23      Diagnosis: Rhabdomyolysis     Surgery: none this admission    Pertinent Medical History:  has no past medical history on file.       Reason for Admission: fall at home alone, laid on floor from    Recommended Adaptive Equipment:  TBD pending progression/discharge plan     Precautions:  Fall Risk, Cognition, Bed Alarm, TAPS, Sacral Wound     Assessment of current deficits:    [x] Functional mobility  [x]ADLs  [x] Strength               [x]Cognition    [x] Functional transfers   [x] IADLs         [x] Safety Awareness   [x]Endurance    [x] Fine Coordination              [x] Balance      [] Vision/perception   []Sensation     []Gross Motor Coordination  [] ROM  [] Delirium                   [] Motor Control     OT PLAN OF CARE   OT POC based on physician orders, patient diagnosis and results of clinical assessment    Frequency/Duration: 3-5 days/wk for 2 weeks PRN   Specific OT Treatment Interventions to include:   * Instruction/training on adapted ADL techniques and AE recommendations to increase functional independence within precautions       * Training on energy conservation strategies, correct breathing pattern and techniques to improve independence/tolerance for self-care routine  * Functional transfer/mobility training/DME recommendations for increased independence, safety, and fall prevention  * Patient/Family education to increase follow through with safety techniques and functional independence  * Recommendation of environmental modifications for increased safety with functional transfers/mobility and ADLs  * Cognitive retraining/development of therapeutic activities to improve problem solving, judgement, memory, and attention for increased safety/participation in ADL/IADL tasks  * Therapeutic exercise to improve motor endurance, ROM, and functional strength for ADLs/functional transfers  * Therapeutic activities to facilitate/challenge dynamic balance, stand tolerance for increased safety and independence with ADLs    Home Living: Pt lives alone in 2 story home with level entry. Bed and bath on 1st floor. Laundry on main floor. Bathroom setup: walk in shower with shower chair   Equipment owned: none    Prior Level of Function: IND with ADLs    IND with IADLs  ambulated with no AD prior  Driving: yes - pt daughter reports limited driving to required places only  Occupation: retired (born/raised on cattle farm)    Pain Level: reported pain in R side (near ribs) and Right leg unable to stated  Cognition: A&O: 1-2/4 - oriented to self, location as hospital presented with decreased  Follows single step directions ~50% of the time - easily distracted requires redirection for safety    Memory:  fair -    Sequencing:  fair -    Problem solving:  fair -    Judgement/safety:  fair -     Functional Assessment:  AM-PAC Daily Activity Raw Score:  15/24   Initial Eval Status  Date: 3/8/23 Treatment Status  Date: STGs = LTGs  Time frame: 10-14 days   Feeding Minimal Assist   Tray set up   Independent    Grooming Minimal Assist   Moderate [place]    UB Dressing Minimal Assist   Moderate [place]    LB Dressing Dependent   [place] socks  Moderate Assist    Bathing Maximal Assist  Minimal Assist    Toileting Maximal Assist   Minimal Assist    Bed Mobility  Log Roll: Max A  Supine to sit: NT   Sit to supine: Max A   Supine to sit: Minimal Assist   Sit to supine: Minimal Assist    Functional Transfers Sit to stand:  Mod A x 2 from bedside chair   Stand to sit: Mod A x 2   Stand pivot: Mod A x 2  Commode: NT  Sit to stand:Min A   Stand to sit:Min A  Stand pivot: Min A with ww  Commode: Min A with ww    Functional Mobility Mod A x 2   within household distance  Min A with ww    Balance Sitting:     Static - SBA     Dynamic - CGA <> Min A  Standing: Mod A x 2 with ww  Sitting:  Static: Sup  Dynamic: Sup  Standing: Min A with ww   Activity Tolerance FAIR  Limited d/t overall fatigue/weakness   Decreased insight into problem solving requires repeated verbal cues for safety  GOOD   Visual/  Perceptual Glasses: yes - reading only           Vitals WFL         BUE  ROM/Strength/  Fine motor Coordination Hand dominance: Right     RUE: ROM WFL  Limited d/t pain in side     Strength: 4-/5      Strength: FAIR     Coordination:  FAIR     LUE: ROM  WFL     Strength: 4-/5      Strength: FAIR     Coordination: FAIR  increase BUE muscle strength for improved indep with functional transfers       Hearing: WFL   Sensation:  No c/o numbness or tingling   Tone: WFL   Edema: unremarkable    Patient/Family Goal: pt family goal is discharge to [facility]   Based on patient's functional performance as stated below and level of assistance needed prior to admission, this therapist believes that the patient would benefit from further skilled OT following hospital stay in an effort to increase safety, functional independence, and quality of life. Comment: Cleared by RN to see pt. Upon arrival patient seated in bedside chair and agreeable to OT session. At end of session, patient lying supine in bed with call light and phone within reach, all lines and tubes intact. Overall patient demonstrated  decreased independence and safety during completion of ADL/functional transfer/mobility tasks. Pt would benefit from continued skilled OT to increase safety and independence with completion of ADL/IADL tasks for functional independence and quality of life.     Treatment: Pt required vc's for proper technique/safety with hand placement/body mechanics/posture for bed mobility/ADLs/functional tranfers/mobility/ww management. Pt required vc's for sequencing/initiation of ADLs/functional transfers. Pt able to  sit EOB ~5 mins to increase core strength/balance/activity tolerance for ease with ADLs. Pt required increased time to complete ADLs/functional transfers due to overall fatigue/weakness and increased pain. Pt required assist of 2 for transfer from bedside chair to bed d/t increased weakness and fatigue from prolonged sitting. Pt reported  pain in R side with sit to stand transfer required cues for bilateral knee extension d/t forward flexed posture. Increased time required for problem solving provided repeated verbal cues for use of ww and stand pivot from bedside chair to EOB. Pt returned to supine d/t fatigue and required TAPs/wedges for joint/skin integrity. Pt required skilled monitoring of SpO2 during session and education on energy conservation techniques. Pt required rest breaks during session and educated on pursed lip breathing. Pt appeared to have tolerated session well and appears motivated/cooperative/pleasant. Pt instructed on use of call light for assistance and fall prevention. Pt demo'ing fair understanding of education provided. Continue to educate. Rehab Potential: Good  for established goals       LTG: maximize independence with ADLs to return to PLOF    Patient and/or family were instructed on functional diagnosis, prognosis/goals and OT plan of care. Demonstrated FAIR understanding. [] Malnutrition indicators have been identified and nursing has been notified to ensure a dietitian consult is ordered.         Eval Complexity: Low    Evaluation time includes thorough review of current medical information, gathering information on past medical & social history & PLOF, completion of standardized testing, informal observation of tasks, consultation with other medical professions/disciplines, assessment of data & development of POC/goals. Time In: 1420  Time Out: 1453  Total Treatment Time: 18    Min Units   OT Eval Low 10503  x     OT Eval Medium 37305      OT Eval High 84562      OT Re-Eval [id number]       Therapeutic Ex 30203       Therapeutic Activities 45312       ADL/Self Care 58023  18  1   Orthotic Management 18069       Manual 23107     Neuro Re-Ed 17912       Non-Billable Time          Evaluation Time additionally includes thorough review of current medical information, gathering information on past medical history/social history and prior level of function, interpretation of standardized testing/informal observation of tasks, assessment of data and development of plan of care and goals.           STEPH Matthew OTR/L; [id number]

## 2023-03-08 NOTE — PROGRESS NOTES
Internal Medicine Progress Note        Subjective  Patient seen and examined at bedside today morning. Patient resting comfortably. Patient denies any pain. Patient unable to recall reason for fall. Clinically improving. Feeling better. Stable overnight. No other overnight issues reported. No CP, SOB, palpitations, blurred vision, HA, lightheadedness, LOC or focal neurological deficits    Exam:  BP (!) 141/66   Pulse 90   Temp 97.3 °F (36.3 °C) (Temporal)   Resp 18   Ht 6' 2\" (1.88 m)   Wt 170 lb (77.1 kg)   SpO2 93%   BMI 21.83 kg/m²   General appearance: No apparent distress, appears stated age and cooperative. HEENT: Pupils equal, round, and reactive to light. Conjunctivae/corneas clear. Neck: Supple. No jugular venous distention. Trachea midline. Respiratory:  Normal respiratory effort. Clear to auscultation, bilaterally without Rales/Wheezes/Rhonchi. Cardiovascular: Regular rate and rhythm with normal S1/S2 without murmurs, rubs or gallops. Abdomen: Soft, non-tender, non-distended with normal bowel sounds. Musculoskeletal: No clubbing, cyanosis or edema bilaterally. Brisk capillary refill. 2+ lower extremity pulses (dorsalis pedis).    Skin:  No rashes    Neurologic: awake, alert and following commands     Medications:  Reviewed    Infusion Medications    sodium chloride 125 mL/hr at 03/07/23 1154    sodium chloride       Scheduled Medications    sodium chloride flush  5-40 mL IntraVENous 2 times per day    enoxaparin  40 mg SubCUTAneous Daily     PRN Meds: sodium chloride flush, sodium chloride, potassium chloride **OR** potassium alternative oral replacement **OR** potassium chloride, ondansetron **OR** ondansetron, polyethylene glycol, acetaminophen **OR** acetaminophen, aluminum & magnesium hydroxide-simethicone    I/O    Intake/Output Summary (Last 24 hours) at 3/7/2023 2051  Last data filed at 3/7/2023 1824  Gross per 24 hour   Intake 240 ml   Output 250 ml   Net -10 ml Labs:   Recent Labs     03/06/23  1421 03/07/23  0606   WBC 15.1* 11.7*   HGB 16.5 14.7   HCT 47.8 44.1    182       Recent Labs     03/06/23  1421 03/07/23  0606    146   K 4.5 4.0    112*   CO2 24 19*   BUN 47* 49*   CREATININE 0.9 1.1   CALCIUM 9.5 8.5*       Recent Labs     03/06/23  1421 03/07/23  0606   PROT 6.8 5.9*   ALKPHOS 94 73   ALT 76* 71*   * 171*   BILITOT 0.8 0.9       No results for input(s): INR in the last 72 hours. Recent Labs     03/06/23  1421 03/07/23  0606 03/07/23  1328   CKTOTAL 4,446* 3,872* 3,625*       Chronic labs:  Lab Results   Component Value Date    TSH 1.630 05/18/2017       Radiology:  Imaging studies reviewed today. ASSESSMENT:  Rhabdomyolysis  Status post fall  Transaminitis    PLAN:  Continue with IV fluids at 125 cc an hour  CT head negative  CT cervical spine shows no fracture  CT pelvis bilateral hip arthroplasties noted  Chest x-ray negative  X-ray right pelvis negative  Fall precautions  We will trend LFTs        Diet: ADULT DIET; Regular  Code Status: Full Code  PT/OT Eval Status:     DVT Prophylaxis:   Lovenox  Recommended disposition at discharge:      +++++++++++++++++++++++++++++++++++++++++++++++++  Sheldon Sommers MD   [facility].  +++++++++++++++++++++++++++++++++++++++++++++++++  NOTE: This report was transcribed using voice recognition software. Every effort was made to ensure accuracy; however, inadvertent computerized transcription errors may be present.

## 2023-03-08 NOTE — PROGRESS NOTES
Physical Therapy  Physical Therapy Initial Assessment     Name: Destin Crane  : 1932  MRN: [id number]      Date of Service: 3/8/2023    Evaluating PT:  Stevan Mayer, PT, DPT, [id number]    Room #:  3009/8071-D  Diagnosis:  Rhabdomyolysis [M62.82]  Acute cystitis with hematuria [N30.01]  Contusion of right hip, initial encounter [S70.01XA]  Fall, initial encounter [W19. XXXA]  Non-traumatic rhabdomyolysis [M62.82]  PMHx/PSHx:  No past medical history on file. Past Surgical History:   Procedure Laterality Date    CARDIAC SURGERY      JOINT REPLACEMENT      TOTAL HIP ARTHROPLASTY        Procedure/Surgery:  none this admission  Precautions:  Fall Risk,  Cognition - dementia, Bed/Chair alarm, TAPS, Sacral pressure wound  Equipment Needs:  TBD    SUBJECTIVE:    Pt lives alone in a 2 story condo with level entry. Bed is on 1 floor and bath is on 1 floor. Pt ambulated with no AD PTA. Pt owns no DME. OBJECTIVE:   Initial Evaluation  Date: 3/8/23 Treatment Short Term/ Long Term   Goals   AM-PAC 6 Clicks 61/07     Was pt agreeable to Eval/treatment? yes     Does pt have pain?  No c/o pain at rest, pain in R LB with movement     Bed Mobility  Rolling: MaxA  Supine to sit: MaxA with HOB elevated  Sit to supine: MaxA  Scooting: ModA  Rolling: Herve  Supine to sit: Herve   Sit to supine: Herve  Scooting: Herve   Transfers Sit to stand: ModA with bed elevated; MaxA from chair  ModAx2 from bedside chair - d/t fatigue (PM)  Stand to sit: ModA  Stand pivot: Herve with [address]  Sit to stand: Herve  Stand to sit: Herve  Stand pivot: Sup with [address]   Ambulation    Side stepped ~4 feet (AM/PM) at EOB with [address] Herve/ModA  25+ feet with [address] Sup   Stair negotiation: ascended and descended NT  TBD   ROM BUE:  Refer to OT note  BLE:  WFL     Strength BUE:  Refer to OT note  LLE:  4+/5  RLE: 2+/5 with MMT  Improve by 1/3 MMT   Balance Sitting EOB:  SBA>Herve  Dynamic Standing:  Herve/ModA with [address]  Sitting EOB:  Sup  Dynamic Standing:  Sup with [address] Pt is A & O x 1 to person; unable to recall place, month, or year  Sensation:  Pt denies numbness and tingling to extremities  Edema:  unremarkable    Vitals:  SPO2: 93% RA  HR: 76 bpm    Patient education  Pt educated on role of PT, importance of OOB mobility, and safety with functional mobility    Patient response to education:   Pt verbalized understanding Pt demonstrated skill Pt requires further education in this area   x With assist x     ASSESSMENT:    Conditions Requiring Skilled Therapeutic Intervention:    [x]Decreased strength     [x]Decreased ROM  [x]Decreased functional mobility  [x]Decreased balance   [x]Decreased endurance   [x]Decreased posture  []Decreased sensation  []Decreased coordination   []Decreased vision  [x]Decreased safety awareness   [x]Increased pain       Comments:  Pt was in bed upon PT entry and agreeable to participate. Pt required heavy assistance with trunk and BLE to transfer supine to sit. Pt was able to maintain fair seated balance with varying assist d/t slight posterior lean. Pt completed sit<>stand transfer (EOB x2, Chair x1) with heavy lift assist and verbal cues for hand placement. Side stepped at EOB with verbal cues for sequencing, posture, and step length, and assist for [employer] management and balance. Stand pivot transfer completed to bedside chair with [employer] with similar assist. Pt was seated in bedside chair with all needs met and call light in reach. RN notified. PT later returned to assist OT in transferring pt back to bed. Pt required increased assist for sit<>stand transfer from chair d/t fatigue. Stand pivot completed to EOB. Pt requested to stand for longer period ~5 minutes, noted L lateral lean. Pt side stepped toward [facility] with [employer] with similar assist. Pt was assisted with trunk and BLEs to return to supine. All needs were met and call light in reach at conclusion of session. OT present.     Treatment:  Patient practiced and was instructed in the following treatment:    Bed mobility training - pt given verbal and tactile cues to facilitate proper sequencing and safety during rolling and supine>sit as well as provided with physical assistance to complete task   Sitting EOB for >15 minutes for upright tolerance, postural awareness and BLE ROM  Transfer training - pt was given verbal and tactile cues to facilitate proper hand placement, technique and safety during sit to stand and stand to sit as well as provided with physical assistance. Gait training- pt was given verbal and tactile cues to facilitate balance, [facility] management/approximation, sequencing, and safety during ambulation as well as provided with physical assistance. Skilled positioning - Pt placed in the semi fowlers position with pillows utilized to facilitate upright posture, joint and skin integrity, and interaction with environment. Skilled monitoring of vitals and symptoms throughout session. Pt's/ family goals   1. To return to PLOF    Prognosis is fair for reaching above PT goals. Patient and or family understand(s) diagnosis, prognosis, and plan of care. yes    PHYSICAL THERAPY PLAN OF CARE:    PT POC is established based on physician order and patient diagnosis     Referring provider/PT Order:    03/06/23 1930  PT evaluation and treat  Start:  03/06/23 1930,   End:  03/06/23 1930,   ONE TIME,   Standing Count:  1 Occurrences,   R         Kala Benitez MD     Diagnosis:  Rhabdomyolysis [M62.82]  Acute cystitis with hematuria [N30.01]  Contusion of right hip, initial encounter [S70.01XA]  Fall, initial encounter [W19. XXXA]  Non-traumatic rhabdomyolysis [M62.82]  Specific instructions for next treatment:  improve strength, balance, and independence with functional mobility    Current Treatment Recommendations:     [x] Strengthening to improve independence with functional mobility   [x] ROM to improve independence with functional mobility   [x] Balance Training to improve static/dynamic balance and to reduce fall risk  [x] Endurance Training to improve activity tolerance during functional mobility   [x] Transfer Training to improve safety and independence with all functional transfers   [x] Gait Training to improve gait mechanics, endurance and assess need for appropriate assistive device  [x] Stair Training in preparation for safe discharge home and/or into the community   [x] Positioning to prevent skin breakdown and contractures  [x] Safety and Education Training   [x] Patient/Caregiver Education   [] HEP  [x] Other     PT long term treatment goals are located in above grid    Time in  1055  Time out  1140    Time in  1433  Time out  1448    Total Treatment Time  45 minutes     Evaluation Time includes thorough review of current medical information, gathering information on past medical history/social history and prior level of function, completion of standardized testing/informal observation of tasks, assessment of data and education on plan of care and goals.     CPT codes:  [x] Low Complexity PT evaluation 96748  [] Moderate Complexity PT evaluation 70808  [] High Complexity PT evaluation 74160  [] PT Re-evaluation 80527  [] Gait training 59664 0 minutes  [] Manual therapy 05776 0 minutes  [x] Therapeutic activities 42224 45 minutes  [] Therapeutic exercises 56971 0 minutes  [] Neuromuscular reeducation 04807 0 minutes     Terri Beltrán PT, DPT  [id number]

## 2023-03-09 LAB
ALBUMIN SERPL-MCNC: 2.9 G/DL (ref 3.5–5.2)
ALP BLD-CCNC: 79 U/L (ref 40–129)
ALT SERPL-CCNC: 71 U/L (ref 0–40)
ANION GAP SERPL CALCULATED.3IONS-SCNC: 8 MMOL/L (ref 7–16)
AST SERPL-CCNC: 81 U/L (ref 0–39)
BILIRUB SERPL-MCNC: 0.7 MG/DL (ref 0–1.2)
BUN BLDV-MCNC: 21 MG/DL (ref 6–23)
CALCIUM SERPL-MCNC: 8.6 MG/DL (ref 8.6–10.2)
CHLORIDE BLD-SCNC: 108 MMOL/L (ref 98–107)
CO2: 20 MMOL/L (ref 22–29)
CREAT SERPL-MCNC: 0.6 MG/DL (ref 0.7–1.2)
GFR SERPL CREATININE-BSD FRML MDRD: >60 ML/MIN/1.73
GLUCOSE BLD-MCNC: 103 MG/DL (ref 74–99)
MAGNESIUM: 2.1 MG/DL (ref 1.6–2.6)
PHOSPHORUS: 3 MG/DL (ref 2.5–4.5)
POTASSIUM SERPL-SCNC: 4 MMOL/L (ref 3.5–5)
SODIUM BLD-SCNC: 136 MMOL/L (ref 132–146)
TOTAL CK: 692 U/L (ref 20–200)
TOTAL PROTEIN: 5.7 G/DL (ref 6.4–8.3)
URINE CULTURE, ROUTINE: NORMAL

## 2023-03-09 PROCEDURE — 97530 THERAPEUTIC ACTIVITIES: CPT

## 2023-03-09 PROCEDURE — 84100 ASSAY OF PHOSPHORUS: CPT

## 2023-03-09 PROCEDURE — 83735 ASSAY OF MAGNESIUM: CPT

## 2023-03-09 PROCEDURE — 80053 COMPREHEN METABOLIC PANEL: CPT

## 2023-03-09 PROCEDURE — 36415 COLL VENOUS BLD VENIPUNCTURE: CPT

## 2023-03-09 PROCEDURE — 82550 ASSAY OF CK (CPK): CPT

## 2023-03-09 PROCEDURE — 97535 SELF CARE MNGMENT TRAINING: CPT

## 2023-03-09 PROCEDURE — 1200000000 HC SEMI PRIVATE

## 2023-03-09 PROCEDURE — 2580000003 HC RX 258: Performed by: STUDENT IN AN ORGANIZED HEALTH CARE EDUCATION/TRAINING PROGRAM

## 2023-03-09 PROCEDURE — 2580000003 HC RX 258: Performed by: FAMILY MEDICINE

## 2023-03-09 RX ADMIN — SODIUM CHLORIDE: 9 INJECTION, SOLUTION INTRAVENOUS at 17:21

## 2023-03-09 RX ADMIN — SODIUM CHLORIDE, PRESERVATIVE FREE 10 ML: 5 INJECTION INTRAVENOUS at 21:28

## 2023-03-09 NOTE — PROGRESS NOTES
Physical Therapy  Physical Therapy     Name: Destin Crane  : 1932  MRN: [id number]      Date of Service: 3/9/2023    Evaluating PT:  Stevan Mayer, PT, DPT, [id number]    Room #:  6299/7272-L  Diagnosis:  Rhabdomyolysis [M62.82]  Acute cystitis with hematuria [N30.01]  Contusion of right hip, initial encounter [S70.01XA]  Fall, initial encounter [W19. XXXA]  Non-traumatic rhabdomyolysis [M62.82]  PMHx/PSHx:  No past medical history on file. Past Surgical History:   Procedure Laterality Date    CARDIAC SURGERY      JOINT REPLACEMENT      TOTAL HIP ARTHROPLASTY        Procedure/Surgery:  none this admission  Precautions:  Fall Risk,  Cognition - dementia, Bed/Chair alarm, TAPS, Sacral pressure wound  Equipment Needs:  TBD    SUBJECTIVE:    Pt lives alone in a 2 story condo with level entry. Bed is on 1 floor and bath is on 1 floor. Pt ambulated with no AD PTA. Pt owns no DME. OBJECTIVE:   Initial Evaluation  Date: 3/8/23 Treatment  3/9/2023  Short Term/ Long Term   Goals   AM-PAC 6 Clicks      Was pt agreeable to Eval/treatment? yes Yes     Does pt have pain?  No c/o pain at rest, pain in R LB with movement No co pain at rest  Pain in right le with movement, back, ribs  Groin   6/10    Bed Mobility  Rolling: MaxA  Supine to sit: MaxA with HOB elevated  Sit to supine: MaxA  Scooting: ModA Nt   Hob elevated   Max assist to scoot to eob   Rolling: Herve  Supine to sit: Herve   Sit to supine: Herve  Scooting: Herve   Transfers Sit to stand: ModA with bed elevated; MaxA from chair  ModAx2 from bedside chair - d/t fatigue (PM)  Stand to sit: ModA  Stand pivot: Herve with [employer] Sit to stand mod assist  Stand to sit mod    Stand pivot - ww  mod assist    Sit to stand: Herve  Stand to sit: Herve  Stand pivot: Sup with [employer]   Ambulation    Side stepped ~4 feet (AM/PM) at EOB with [employer] Herve/ModA 48 feet ww mod assist  25+ feet with [employer] Sup   Stair negotiation: ascended and descended NT NT TBD   ROM BUE:  Refer to OT note  BLE: WFL     Strength BUE:  Refer to OT note  LLE:  4+/5  RLE: 2+/5 with MMT  Improve by 1/3 MMT   Balance Sitting EOB:  SBA>Kenneth  Dynamic Standing:  Kenneth/ModA with [employer]  Sitting EOB:  Sup  Dynamic Standing:  Sup with [employer]     Pt is A & O x 1 to person; unable to recall place, month, or year  Not able to tell me his dtrs name    Sensation:  NA  Edema:  unremarkable      Patient education  Pt educated on role of PT, importance of OOB mobility, and safety with functional mobility, seated leg ex     Patient response to education:   Pt verbalized understanding Pt demonstrated skill Pt requires further education in this area   x With assist x     ASSESSMENT:    Conditions Requiring Skilled Therapeutic Intervention:    [x]Decreased strength     [x]Decreased ROM  [x]Decreased functional mobility  [x]Decreased balance   [x]Decreased endurance   [x]Decreased posture  []Decreased sensation  []Decreased coordination   []Decreased vision  [x]Decreased safety awareness   [x]Increased pain       Comments:  Pt was in bed upon PT entry and agreeable to participate. Pt's dtr present and other friends. Had pt scoot oob due to painful ribs. Performed supine ex heel slides and small abd. Pt co groin pain with abd on his left. Pt able to maintain sitting balance. Pt performed function as per above. With gait mod assist with ww and had sba of 1 for safety. Pt left sitting up in the chair and nursing notified and family going to be sitting with pt and will watch pt. Treatment:  Patient practiced and was instructed in the following treatment:    Sitting EOB for >15 minutes for upright tolerance, postural awareness and BLE ROM  Transfer training - pt was given verbal and tactile cues to facilitate proper hand placement, technique and safety during sit to stand and stand to sit as well as provided with physical assistance.   Gait training- pt was given verbal and tactile cues to facilitate balance, [employer] management/approximation, sequencing, and safety during ambulation as well as provided with physical assistance. Pt and family education - educated on call light and chair ex to do. Laq, ankle pumps  Pt's/ family goals   1. To return to PLOF    Prognosis is fair for reaching above PT goals. Patient and or family understand(s) diagnosis, prognosis, and plan of care. yes    PHYSICAL THERAPY PLAN OF CARE:    PT POC is established based on physician order and patient diagnosis     Referring provider/PT Order:    03/06/23 1930  PT evaluation and treat  Start:  03/06/23 1930,   End:  03/06/23 1930,   ONE TIME,   Standing Count:  1 Occurrences,   R         Nancy Davis MD     Diagnosis:  Rhabdomyolysis [M62.82]  Acute cystitis with hematuria [N30.01]  Contusion of right hip, initial encounter [S70.01XA]  Fall, initial encounter [W19. XXXA]  Non-traumatic rhabdomyolysis [M62.82]  Specific instructions for next treatment:  improve strength, balance, and independence with functional mobility    Current Treatment Recommendations:     [x] Strengthening to improve independence with functional mobility   [x] ROM to improve independence with functional mobility   [x] Balance Training to improve static/dynamic balance and to reduce fall risk  [x] Endurance Training to improve activity tolerance during functional mobility   [x] Transfer Training to improve safety and independence with all functional transfers   [x] Gait Training to improve gait mechanics, endurance and assess need for appropriate assistive device  [x] Stair Training in preparation for safe discharge home and/or into the community   [x] Positioning to prevent skin breakdown and contractures  [x] Safety and Education Training   [x] Patient/Caregiver Education   [] HEP  [x] Other     PT long term treatment goals are located in above grid    Time in  230  Time out  303    Total Treatment Time  33 minutes       CPT codes:  [] Low Complexity PT evaluation 64526  [] Moderate Complexity PT evaluation [id number]  [] High Complexity PT evaluation [id number]  [] PT Re-evaluation [id number]  [] Gait training 91828 0 minutes  [] Manual therapy 90710 0 minutes  [x] Therapeutic activities 38209 45 minutes  [] Therapeutic exercises 83091 0 minutes  [] Neuromuscular reeducation 62338 0 minutes     Lissa Sandifer, PTA  [id number]

## 2023-03-09 NOTE — CARE COORDINATION
Social Work/Case Management Transition of Care Planning (Harpreet [place] [phone]): Per report, patient refused labs this morning. Labs from yesterday indicate CK is trending down. CK was 1785 on 3/8. Patient is on room air. PT/OT scores noted to be 10/15. Patient was only able to side step 4' with FWW min/mod assist.  Per Aissatou Camara at [facility], they can accept patient and will start pre-cert today. KELLIE/destination completed. 7000 started and will need completed when discharge order is entered. Discharge envelope with ambulance form is in the soft chart and will need completed day of discharge. CM/SW will follow.   STAS Christian  3/9/2023

## 2023-03-09 NOTE — CARE COORDINATION
Per Vannessa Norris from [place] [address], precert has been obtained and is good for 48 hrs. Per RN, internal med is consulting neurology for [facility]. Await input and plan. Vannessa Norris from [facility] house updated. KELLIE/destination completed. 7000 started and will need completed when discharge order is entered. Discharge envelope with ambulance form is in the soft chart and will need completed day of discharge.     aRhat Dewitt RN CM  [phone]

## 2023-03-09 NOTE — PROGRESS NOTES
Occupational Therapy  OT BEDSIDE TREATMENT NOTE   [address] [address]  [address] [place] [address]       Date:3/9/2023  Patient Name: Jing Han  MRN: [id number]  : 1932  Room: [address] [place] [address]     Per OT Eval:    Evaluating OT: To Fu, [address] OTR/L; [id number]       Referring Provider: Sommer Moran MD    Specific Provider Orders/Date: OT Eval and Treat 3/6/23       Diagnosis: Rhabdomyolysis     Surgery: none this admission    Pertinent Medical History:  has no past medical history on file.        Reason for Admission: fall at home alone, laid on floor from     Recommended Adaptive Equipment:  TBD pending progression/discharge plan      Precautions:  Fall Risk, Cognition, Bed Alarm, TAPS, Sacral Wound      Assessment of current deficits:    [x] Functional mobility            [x]ADLs           [x] Strength                  [x]Cognition    [x] Functional transfers          [x] IADLs         [x] Safety Awareness   [x]Endurance    [x] Fine Coordination                         [x] Balance      [] Vision/perception   []Sensation      []Gross Motor Coordination             [] ROM           [] Delirium                   [] Motor Control      OT PLAN OF CARE   OT POC based on physician orders, patient diagnosis and results of clinical assessment     Frequency/Duration: 3-5 days/wk for 2 weeks PRN   Specific OT Treatment Interventions to include:   * Instruction/training on adapted ADL techniques and AE recommendations to increase functional independence within precautions       * Training on energy conservation strategies, correct breathing pattern and techniques to improve independence/tolerance for self-care routine  * Functional transfer/mobility training/DME recommendations for increased independence, safety, and fall prevention  * Patient/Family education to increase follow through with safety techniques and functional independence  * Recommendation of environmental modifications for increased safety with functional transfers/mobility and ADLs  * Cognitive retraining/development of therapeutic activities to improve problem solving, judgement, memory, and attention for increased safety/participation in ADL/IADL tasks  * Therapeutic exercise to improve motor endurance, ROM, and functional strength for ADLs/functional transfers  * Therapeutic activities to facilitate/challenge dynamic balance, stand tolerance for increased safety and independence with ADLs     Home Living: Pt lives alone in 2 story home with level entry. Bed and bath on 1st floor. Laundry on main floor.     Bathroom setup: walk in shower with shower chair   Equipment owned: none     Prior Level of Function: IND with ADLs    IND with IADLs  ambulated with no AD prior  Driving: yes - pt daughter reports limited driving to required places only  Occupation: retired (born/raised on cattle farm)     Pain Level: reported pain in R side, posterior (near ribs) 6/10 with activity, 1/10 at rest     Cognition: A&O: 1-2/4 - oriented to self, cues for recall, pleasantly confused (baseline dementia)  Follows single step directions ~50% of the time - easily distracted requires redirection for safety               Memory:  fair -               Sequencing:  fair -               Problem solving:  fair -               Judgement/safety:  fair -                Functional Assessment:  AM-PAC Daily Activity Raw Score:  15/24    Initial Eval Status  Date: 3/8/23 Treatment Status  Date:   3/9/23 STGs = LTGs  Time frame: 10-14 days   Feeding Minimal Assist   Tray set up  Set up  Seated in chair   Independent    Grooming Minimal Assist  Min A  With simple tasks, seated in chair Moderate [place]    UB Dressing Minimal Assist   Min A  Doff/chencho gown seated at EOB Moderate [place]    LB Dressing Dependent   Chencho socks  Dep  Decreased ROM & strength in B LE Moderate Assist    Bathing Maximal Assist  Max A  Simulated task Minimal Assist    Toileting Maximal Assist   Dep  Using [employer] Minimal Assist    Bed Mobility  Log Roll: Max A  Supine to sit: NT   Sit to supine: Max A  NT  Seated at EOB with PTA upon arrival   Supine to sit: Minimal Assist   Sit to supine: Minimal Assist    Functional Transfers Sit to stand: Mod A x 2 from bedside chair   Stand to sit: Mod A x 2   Stand pivot: Mod A x 2  Commode: NT Mod A  Sit < > stand  EOB & chair   Sit to stand:Min A   Stand to sit:Min A  Stand pivot: Min A with ww  Commode: Min A with ww    Functional Mobility Mod A x 2   within household distance  Mod A  With walker, short household distance  Min A with ww    Balance Sitting:     Static - SBA     Dynamic - CGA <> Min A  Standing: Mod A x 2 with ww Sitting: SBA  Standing: Mod A  With walker   Sitting:  Static: Sup  Dynamic: Sup  Standing: Min A with ww   Activity Tolerance FAIR  Limited d/t overall fatigue/weakness   Decreased insight into problem solving requires repeated verbal cues for safety  Fair GOOD   Visual/  Perceptual Glasses: yes - reading only              Vitals WFL             BUE  ROM/Strength/  Fine motor Coordination Hand dominance: Right     RUE: ROM WFL  Limited d/t pain in side     Strength: 4-/5      Strength: FAIR     Coordination:  FAIR     LUE: ROM  WFL     Strength: 4-/5      Strength: FAIR     Coordination: FAIR   increase BUE muscle strength for improved indep with functional transfers        Education:  Pt was educated through out treatment regarding proper technique & safety with bed mobility, functional transfers & mobility, walker management, importance of OOB activity & ADL compensatory strategies to ease tasks, improve safety & prevent falls to return home safely.      Comments: Upon arrival pt was in bed & agreeable for therapy. At end of session pt was seated in chair, family present through out session, all lines and tubes intact, call light within reach. Requesting nsg order walker & BSC.    Pt  has made Fair progress towards set goals. Continue with current plan of care      Treatment Time In: 2:35            Treatment Time Out: 3:05           Treatment Charges: Mins Units   Ther Ex  21726     Manual Therapy [address] [facility]     Thera Activities 50308 15 1   ADL/Home Mgt 67160 15 1   Neuro Re-ed 42205     Group Therapy      Orthotic manage/training  42533     Non-Billable Time     Total Timed Treatment 30 2       Catia MOJICA  [address] [place] [address], [address] [place] [address]

## 2023-03-09 NOTE — PROGRESS NOTES
Internal Medicine Progress Note        Subjective  Patient seen and examined at bedside today morning. Patient resting comfortably. Patient did not allow lab draw today morning. Clinically improving. Feeling better. Stable overnight. No other overnight issues reported. No CP, SOB, palpitations, blurred vision, HA, lightheadedness, LOC or focal neurological deficits    Exam:  /60   Pulse 65   Temp 97.3 °F (36.3 °C) (Temporal)   Resp 16   Ht 6' 2\" (1.88 m)   Wt 183 lb 1 oz (83 kg)   SpO2 98%   BMI 23.50 kg/m²   General appearance: No apparent distress, appears stated age and cooperative. HEENT: Pupils equal, round, and reactive to light. Conjunctivae/corneas clear. Neck: Supple. No jugular venous distention. Trachea midline. Respiratory:  Lung ausculation refused   Cardiovascular: Regular rate and rhythm with normal S1/S2 without murmurs, rubs or gallops. Abdomen: Soft, non-tender, non-distended with normal bowel sounds. Musculoskeletal: No clubbing, cyanosis or edema bilaterally. Brisk capillary refill. 2+ lower extremity pulses (dorsalis pedis).    Skin:  No rashes    Neurologic: awake, alert and following commands     Medications:  Reviewed    Infusion Medications    sodium chloride 125 mL/hr at 03/08/23 1203    sodium chloride       Scheduled Medications    sodium chloride flush  5-40 mL IntraVENous 2 times per day    enoxaparin  40 mg SubCUTAneous Daily     PRN Meds: sodium chloride flush, sodium chloride, potassium chloride **OR** potassium alternative oral replacement **OR** potassium chloride, ondansetron **OR** ondansetron, polyethylene glycol, acetaminophen **OR** acetaminophen, aluminum & magnesium hydroxide-simethicone    I/O    Intake/Output Summary (Last 24 hours) at 3/9/2023 0818  Last data filed at 3/8/2023 1530  Gross per 24 hour   Intake 240 ml   Output --   Net 240 ml       Labs:   Recent Labs     03/06/23  1421 03/07/23  0606 03/08/23  0738   WBC 15.1* 11.7* 7.7 HGB 16.5 14.7 13.7   HCT 47.8 44.1 41.8    182 153       Recent Labs     03/06/23  1421 03/07/23  0606 03/08/23  0738    146 138   K 4.5 4.0 3.4*    112* 107   CO2 24 19* 22   BUN 47* 49* 32*   CREATININE 0.9 1.1 0.7   CALCIUM 9.5 8.5* 8.0*   PHOS  --   --  2.5       Recent Labs     03/06/23  1421 03/07/23  0606 03/08/23  0738   PROT 6.8 5.9* 5.0*   ALKPHOS 94 73 63   ALT 76* 71* 64*   * 171* 113*   BILITOT 0.8 0.9 0.9       No results for input(s): INR in the last 72 hours. Recent Labs     03/07/23  0606 03/07/23  1328 03/08/23  0738   CKTOTAL 3,872* 3,625* 1,785*       Chronic labs:  Lab Results   Component Value Date    TSH 1.630 05/18/2017       Radiology:  Imaging studies reviewed today. ASSESSMENT:  Rhabdomyolysis  Status post fall  Transaminitis    PLAN:  Continue with IV fluids at 125 cc an hour, no evidence of volume overload  CT head negative  CT cervical spine shows no fracture  CT pelvis bilateral hip arthroplasties noted  Chest x-ray negative  X-ray right pelvis negative  Fall precautions  We will trend LFTs    Patient refused lab draw today, attempted to convince patient to draw blood during interview, but he refused again. Will try again later. Diet: ADULT DIET; Regular  Code Status: Full Code  PT/OT Eval Status:   Participating   DVT Prophylaxis:   Lovenox  Recommended disposition at discharge:  Subacute rehab    +++++++++++++++++++++++++++++++++++++++++++++++++  Allison Mills MD   [facility].  +++++++++++++++++++++++++++++++++++++++++++++++++  NOTE: This report was transcribed using voice recognition software. Every effort was made to ensure accuracy; however, inadvertent computerized transcription errors may be present.

## 2023-03-10 ENCOUNTER — HOSPITAL ENCOUNTER (EMERGENCY)
Age: 88
Discharge: NURSING, REHAB OR LONG-TERM CARE | End: 2023-03-13
Attending: EMERGENCY MEDICINE
Payer: MEDICARE

## 2023-03-10 VITALS
OXYGEN SATURATION: 93 % | BODY MASS INDEX: 25.29 KG/M2 | RESPIRATION RATE: 16 BRPM | SYSTOLIC BLOOD PRESSURE: 125 MMHG | TEMPERATURE: 98.2 F | HEIGHT: 74 IN | DIASTOLIC BLOOD PRESSURE: 66 MMHG | HEART RATE: 77 BPM | WEIGHT: 197.1 LBS

## 2023-03-10 DIAGNOSIS — Z86.59 HISTORY OF DEMENTIA: ICD-10-CM

## 2023-03-10 DIAGNOSIS — F39 MOOD DISORDER (HCC): Primary | ICD-10-CM

## 2023-03-10 LAB
ALBUMIN SERPL-MCNC: 2.4 G/DL (ref 3.5–5.2)
ALBUMIN SERPL-MCNC: 3.4 G/DL (ref 3.5–5.2)
ALP BLD-CCNC: 67 U/L (ref 40–129)
ALP BLD-CCNC: 94 U/L (ref 40–129)
ALT SERPL-CCNC: 59 U/L (ref 0–40)
ALT SERPL-CCNC: 79 U/L (ref 0–40)
ANION GAP SERPL CALCULATED.3IONS-SCNC: 8 MMOL/L (ref 7–16)
ANION GAP SERPL CALCULATED.3IONS-SCNC: 9 MMOL/L (ref 7–16)
AST SERPL-CCNC: 54 U/L (ref 0–39)
AST SERPL-CCNC: 69 U/L (ref 0–39)
BASOPHILS ABSOLUTE: 0.03 E9/L (ref 0–0.2)
BASOPHILS RELATIVE PERCENT: 0.4 % (ref 0–2)
BILIRUB SERPL-MCNC: 0.4 MG/DL (ref 0–1.2)
BILIRUB SERPL-MCNC: 0.5 MG/DL (ref 0–1.2)
BILIRUBIN URINE: NEGATIVE
BLOOD, URINE: NEGATIVE
BUN BLDV-MCNC: 19 MG/DL (ref 6–23)
BUN BLDV-MCNC: 20 MG/DL (ref 6–23)
CALCIUM SERPL-MCNC: 7.8 MG/DL (ref 8.6–10.2)
CALCIUM SERPL-MCNC: 9.1 MG/DL (ref 8.6–10.2)
CHLORIDE BLD-SCNC: 106 MMOL/L (ref 98–107)
CHLORIDE BLD-SCNC: 109 MMOL/L (ref 98–107)
CLARITY: CLEAR
CO2: 22 MMOL/L (ref 22–29)
CO2: 26 MMOL/L (ref 22–29)
COLOR: YELLOW
CREAT SERPL-MCNC: 0.6 MG/DL (ref 0.7–1.2)
CREAT SERPL-MCNC: 0.8 MG/DL (ref 0.7–1.2)
EOSINOPHILS ABSOLUTE: 0.24 E9/L (ref 0.05–0.5)
EOSINOPHILS RELATIVE PERCENT: 2.8 % (ref 0–6)
FOLATE: 7.7 NG/ML (ref 4.8–24.2)
GFR SERPL CREATININE-BSD FRML MDRD: >60 ML/MIN/1.73
GFR SERPL CREATININE-BSD FRML MDRD: >60 ML/MIN/1.73
GLUCOSE BLD-MCNC: 113 MG/DL (ref 74–99)
GLUCOSE BLD-MCNC: 129 MG/DL (ref 74–99)
GLUCOSE URINE: NEGATIVE MG/DL
HBA1C MFR BLD: 5.8 % (ref 4–5.6)
HCT VFR BLD CALC: 46.2 % (ref 37–54)
HEMOGLOBIN: 15.3 G/DL (ref 12.5–16.5)
IMMATURE GRANULOCYTES #: 0.08 E9/L
IMMATURE GRANULOCYTES %: 0.9 % (ref 0–5)
KETONES, URINE: NEGATIVE MG/DL
LEUKOCYTE ESTERASE, URINE: NEGATIVE
LYMPHOCYTES ABSOLUTE: 1.14 E9/L (ref 1.5–4)
LYMPHOCYTES RELATIVE PERCENT: 13.4 % (ref 20–42)
MAGNESIUM: 1.9 MG/DL (ref 1.6–2.6)
MCH RBC QN AUTO: 31.5 PG (ref 26–35)
MCHC RBC AUTO-ENTMCNC: 33.1 % (ref 32–34.5)
MCV RBC AUTO: 95.3 FL (ref 80–99.9)
MONOCYTES ABSOLUTE: 0.74 E9/L (ref 0.1–0.95)
MONOCYTES RELATIVE PERCENT: 8.7 % (ref 2–12)
NEUTROPHILS ABSOLUTE: 6.3 E9/L (ref 1.8–7.3)
NEUTROPHILS RELATIVE PERCENT: 73.8 % (ref 43–80)
NITRITE, URINE: NEGATIVE
PDW BLD-RTO: 13 FL (ref 11.5–15)
PH UA: 6 (ref 5–9)
PHOSPHORUS: 3 MG/DL (ref 2.5–4.5)
PLATELET # BLD: 211 E9/L (ref 130–450)
PMV BLD AUTO: 10.9 FL (ref 7–12)
POTASSIUM SERPL-SCNC: 3.9 MMOL/L (ref 3.5–5)
POTASSIUM SERPL-SCNC: 4.3 MMOL/L (ref 3.5–5)
PROTEIN UA: NEGATIVE MG/DL
RBC # BLD: 4.85 E12/L (ref 3.8–5.8)
SODIUM BLD-SCNC: 139 MMOL/L (ref 132–146)
SODIUM BLD-SCNC: 141 MMOL/L (ref 132–146)
SPECIFIC GRAVITY UA: 1.02 (ref 1–1.03)
TOTAL CK: 334 U/L (ref 20–200)
TOTAL PROTEIN: 4.7 G/DL (ref 6.4–8.3)
TOTAL PROTEIN: 6.5 G/DL (ref 6.4–8.3)
TSH SERPL DL<=0.05 MIU/L-ACNC: 2.86 UIU/ML (ref 0.27–4.2)
UROBILINOGEN, URINE: 0.2 E.U./DL
VITAMIN B-12: 610 PG/ML (ref 211–946)
WBC # BLD: 8.5 E9/L (ref 4.5–11.5)

## 2023-03-10 PROCEDURE — 36415 COLL VENOUS BLD VENIPUNCTURE: CPT

## 2023-03-10 PROCEDURE — 99222 1ST HOSP IP/OBS MODERATE 55: CPT | Performed by: PSYCHIATRY & NEUROLOGY

## 2023-03-10 PROCEDURE — 82746 ASSAY OF FOLIC ACID SERUM: CPT

## 2023-03-10 PROCEDURE — 84165 PROTEIN E-PHORESIS SERUM: CPT

## 2023-03-10 PROCEDURE — 80053 COMPREHEN METABOLIC PANEL: CPT

## 2023-03-10 PROCEDURE — 83735 ASSAY OF MAGNESIUM: CPT

## 2023-03-10 PROCEDURE — 83036 HEMOGLOBIN GLYCOSYLATED A1C: CPT

## 2023-03-10 PROCEDURE — 97530 THERAPEUTIC ACTIVITIES: CPT

## 2023-03-10 PROCEDURE — 97161 PT EVAL LOW COMPLEX 20 MIN: CPT

## 2023-03-10 PROCEDURE — 93005 ELECTROCARDIOGRAM TRACING: CPT | Performed by: EMERGENCY MEDICINE

## 2023-03-10 PROCEDURE — 81003 URINALYSIS AUTO W/O SCOPE: CPT

## 2023-03-10 PROCEDURE — 86334 IMMUNOFIX E-PHORESIS SERUM: CPT

## 2023-03-10 PROCEDURE — 82607 VITAMIN B-12: CPT

## 2023-03-10 PROCEDURE — 96372 THER/PROPH/DIAG INJ SC/IM: CPT

## 2023-03-10 PROCEDURE — 85025 COMPLETE CBC W/AUTO DIFF WBC: CPT

## 2023-03-10 PROCEDURE — 2580000003 HC RX 258: Performed by: STUDENT IN AN ORGANIZED HEALTH CARE EDUCATION/TRAINING PROGRAM

## 2023-03-10 PROCEDURE — 99285 EMERGENCY DEPT VISIT HI MDM: CPT

## 2023-03-10 PROCEDURE — 84443 ASSAY THYROID STIM HORMONE: CPT

## 2023-03-10 PROCEDURE — 82550 ASSAY OF CK (CPK): CPT

## 2023-03-10 PROCEDURE — 84100 ASSAY OF PHOSPHORUS: CPT

## 2023-03-10 PROCEDURE — 80307 DRUG TEST PRSMV CHEM ANLYZR: CPT

## 2023-03-10 RX ADMIN — SODIUM CHLORIDE: 9 INJECTION, SOLUTION INTRAVENOUS at 05:40

## 2023-03-10 ASSESSMENT — PAIN - FUNCTIONAL ASSESSMENT: PAIN_FUNCTIONAL_ASSESSMENT: NONE - DENIES PAIN

## 2023-03-10 ASSESSMENT — PAIN DESCRIPTION - LOCATION: LOCATION: BACK

## 2023-03-10 NOTE — CARE COORDINATION
Social Work/Case Management Transition of Care Planning ([facility]ile[facility] [phone]): Per report, family requested neurology consult due to AMS. Neurology indicated patient is ok for discharge. Discharge order noted. Pre-cert was obtained 3/9.  7000 completed. Notified Yang Da Silva of [facility]. Per PT, patient did better with therapy today and is safe to transport via ambulette. Await response from Yang Da Silva regarding facilities ability to transport. Discharge envelope is in the soft chart. STAS Segura  3/10/2023    Update:  Per Yang Da Silva, they can provide transport. Plan is for patient to be picked up at 2:00pm.  Met with patient and his daughter, Amrit John, at bedside to update them. Also notified floor nurse.   STAS Segura  3/10/2023

## 2023-03-10 NOTE — PROGRESS NOTES
Physical Therapy  Treatment    Name: Love Garrison  : 1932  MRN: [id number]      Date of Service: 3/10/2023    Evaluating PT:  Samaria Strong PT, DPT, [id number]    Room #:  3472/0816-X  Diagnosis:  Rhabdomyolysis [M62.82]  Acute cystitis with hematuria [N30.01]  Contusion of right hip, initial encounter [S70.01XA]  Fall, initial encounter [W19. XXXA]  Non-traumatic rhabdomyolysis [M62.82]  PMHx/PSHx:  No past medical history on file. Past Surgical History:   Procedure Laterality Date    CARDIAC SURGERY      JOINT REPLACEMENT      TOTAL HIP ARTHROPLASTY        Procedure/Surgery:  none this admission  Precautions:  Fall Risk,  Cognition - dementia, Bed/Chair alarm, TAPS, Sacral pressure wound, External Male Catheter   Equipment Needs:  TBD    SUBJECTIVE:    Pt lives alone in a 2 story condo with level entry. Bed is on 1 floor and bath is on 1 floor. Pt ambulated with no AD PTA. Pt owns no DME. OBJECTIVE:   Initial Evaluation  Date: 3/8/23 Treatment  3/10/2023  Short Term/ Long Term   Goals   AM-PAC 6 Clicks 43/51     Was pt agreeable to Eval/treatment? yes Yes     Does pt have pain?  No c/o pain at rest, pain in R LB with movement 5/10 R side pain    Bed Mobility  Rolling: MaxA  Supine to sit: MaxA with HOB elevated  Sit to supine: MaxA  Scooting: ModA Rolling: MaxA  Supine to sit: MaxA   Sit to supine: NT  Scooting: Kenneth   Rolling: Kenneth  Supine to sit: Kenneth   Sit to supine: Kenneth  Scooting: Kenneth   Transfers Sit to stand: ModA with bed elevated; MaxA from chair  ModAx2 from bedside chair - d/t fatigue (PM)  Stand to sit: ModA  Stand pivot: Kenneth with [employer] Sit to stand: Kenneth - bed; Kenneth/ModA from chair  Stand to sit: Kenneth  Stand pivot: Kenneth with [employer]  Sit to stand: Kenneth  Stand to sit: Kenneth  Stand pivot: Sup with [employer]   Ambulation    Side stepped ~4 feet (AM/PM) at EOB with [employer] Kenneth/ModA 120' with [employer] Kenneth 25+ feet with [employer] Sup   Stair negotiation: ascended and descended NT NT TBD   ROM BUE:  Refer to OT note  BLE:  WFL     Strength BUE:  Refer to OT note  LLE:  4+/5  RLE: 2+/5 with MMT  Improve by 1/3 MMT   Balance Sitting EOB:  SBA>Kenneth  Dynamic Standing:  Kenneth/ModA with [employer] Sitting EOB:  SBA>CGA  Dynamic Standing:  Kenneth with [employer] Sitting EOB:  Sup  Dynamic Standing:  Sup with [employer]   Pt is A & O x 1 to person  Edema: unremarkable     Vitals:  Blood Pressure at rest -- Blood Pressure post session --   Heart Rate at rest 74 bpm Heart Rate post session 90 bpm   SPO2 at rest 99% RA SPO2 post session 99% RA     Patient education  Pt educated on role of PT and safety with functional mobility    Patient response to education:   Pt verbalized understanding Pt demonstrated skill Pt requires further education in this area   x x x     ASSESSMENT:    Comments:    Pt was in bed upon PT entry and agreeable to participate. Pt required increased time to complete bed mobility d/t pain in R side. Pt required heavy assist and verbal cues for sequencing to transfer to EOB. Pt completed sit<>stand transfers throughout session (EOB x1, Chair x4) with verbal cues for hand placement and  increased assistance as pt became fatigued. Ambulated in hallway with [employer] with crouched and unsteady gait with quick pace. Pt given steadying assist and verbal cues for [employer] approximation, upright posture, and  slowed pace for safety. Pt was seated in bedside chair with all needs met and call light in reach at conclusion of session. RN notified.     Treatment:  Patient practiced and was instructed in the following treatment:    Bed mobility training - pt given verbal and tactile cues to facilitate proper sequencing and safety during rolling and supine>sit as well as provided with physical assistance to complete task   Sitting EOB for >5 minutes for upright tolerance, postural awareness and BLE ROM  Transfer training - pt was given verbal and tactile cues to facilitate proper hand placement, technique and safety during sit to stand and stand to sit as well as provided with physical assistance. Gait training- pt was given verbal and tactile cues to facilitate balance, posture, WW approximation, and safety during ambulation as well as provided with physical assistance. Skilled positioning - Pt placed in the chair position with pillows utilized to facilitate upright posture, joint and skin integrity, and interaction with environment. Skilled monitoring of vitals throughout session. PLAN:    Patient is making fair progress towards established goals. Will continue with current POC.       Time in  0835  Time out  0915    Total Treatment Time  40 minutes     CPT codes:  [] Gait training 55650 0 minutes  [] Manual therapy 59970 0 minutes  [x] Therapeutic activities 97267 40 minutes  [] Therapeutic exercises 22292 0 minutes  [] Neuromuscular reeducation 92607 0 minutes    Rubi Alvarado PT, DPT  [id number]

## 2023-03-10 NOTE — PLAN OF CARE
Problem: Skin/Tissue Integrity  Goal: Absence of new skin breakdown  Description: 1. Monitor for areas of redness and/or skin breakdown  2. Assess vascular access sites hourly  3. Every 4-6 hours minimum:  Change oxygen saturation probe site  4. Every 4-6 hours:  If on nasal continuous positive airway pressure, respiratory therapy assess nares and determine need for appliance change or resting period.   3/9/2023 2255 by Sonu Franklin RN  Outcome: Progressing     Problem: Safety - Adult  Goal: Free from fall injury  3/9/2023 2255 by Sonu Franklin RN  Outcome: Progressing     Problem: ABCDS Injury Assessment  Goal: Absence of physical injury  3/9/2023 2255 by Sonu Franklin RN  Outcome: Progressing

## 2023-03-10 NOTE — CONSULTS
[facility]  Neurology Consult    Date:  3/10/2023  Patient Name:  Pankaj Jacome  YOB: 1932  MRN: [id number]     PCP:  Dalton Cummings MD   Referring:  No ref. provider found      Chief Complaint: AMS, Fall    History obtained from: Chart and patient    Assessment & Plan  Pankaj Jacome is a 80 y.o. male with a PMHx of dementia who initially presented 03/06/23 for a fall developed a change in mental status. On presentation was noted to have WBC 15.1, CK of 4446,  and subsequently admitted for rhabdomyolysis. Imaging workup negative for acute fractures in the skull, c spine, or pelvis. 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 As far as the acute changes in his mental status noted by family, they seem to be in line with delirium in a hospitalized elderly patient. Delirium   Plan:     1. Reorientation    2. Maintain normal sleep-wake cycle    Falls 2/2 ?neuropathy of unknown etiology    Plan:     1. Follow A1c, TSH, B12 and Folate    2. MRI Brain as outpatient    3. Follow up at neurology office in a couple months     4. D/c to [facility]        History of Present Illness:  Pankaj Jacome is a 80 y.o. right handed male with a history of dementia that initially presented for evaluation of a fall that occurred at approximately 2100 on 03/06/2022 who now is displaying changes in his mental status. Per his daughter, the patient was at his \"normal\" neurological baseline on presentation. He has been saying weird things (stories related to memories of his past work in [employer]), and is only oriented to self. His daughter states that he would normally be able to say what month and year it is. The change in his mental status began two days ago, and has gradually been improving. The patient has a questionable history of falls.  He lives alone and his sister would check on him weekly. On interview the patient states his R hip hurts. He mentions that many of the men in his family suffered from \"memory problems'. The patient has moderate neurocognitive deficits secondary to his preexisting dementia. The stories sound like reiteration of old memories. The patient's physical and neurological exam are notable for decreased sensation from the knees down bilaterally. Prior to this presentation the  patient was living on his own, driving on his own, and had no issues unless there was a detour. The family present state that he is doing better than he was two days ago. The patient is agreeable to being discharged to a facility. The family is hoping to use this as a bridge to get the patient into an assisted living facility, which is appropriate given the situation. Review of Systems:    Complete 10-point review of systems is negative except as noted above in my HPI      Medical History:   No past medical history on file. Surgical History:   Past Surgical History:   Procedure Laterality Date    CARDIAC SURGERY      JOINT REPLACEMENT      TOTAL HIP ARTHROPLASTY          Family History:   No family history on file.     Social History:  Social History     Tobacco Use    Smoking status: Never   Substance Use Topics    Alcohol use: No        Current Medications:      Current Facility-Administered Medications   Medication Dose Route Frequency Provider Last Rate Last Admin    white petrolatum ointment   Topical BID PRN Sheldon Sommers MD        0.9 % sodium chloride infusion   IntraVENous Continuous Sheldon Sommers MD 75 mL/hr at 03/10/23 0540 New Bag at 03/10/23 0540    sodium chloride flush 0.9 % injection 5-40 mL  5-40 mL IntraVENous 2 times per day Macey Ballesteros MD   10 mL at 03/09/23 4090    sodium chloride flush 0.9 % injection 5-40 mL  5-40 mL IntraVENous PRN Macey Ballesteros MD        0.9 % sodium chloride infusion   IntraVENous PRN Macey Ballesteros MD        potassium chloride (KLOR-CON M) extended release tablet 40 mEq  40 mEq Oral PRN Oliverio Friedman MD        Or    potassium bicarb-citric acid (EFFER-K) effervescent tablet 40 mEq  40 mEq Oral PRN Oliverio Friedman MD        Or    potassium chloride 10 mEq/100 mL IVPB (Peripheral Line)  10 mEq IntraVENous PRN Oliverio Friedman MD        enoxaparin (LOVENOX) injection 40 mg  40 mg SubCUTAneous Daily Oliverio Friedman MD   40 mg at 03/08/23 0921    ondansetron (ZOFRAN-ODT) disintegrating tablet 4 mg  4 mg Oral Q8H PRN Oliverio Friedman MD        Or    ondansetron [facility]) injection 4 mg  4 mg IntraVENous Q6H PRN Oliverio Friedman MD        polyethylene glycol (GLYCOLAX) packet 17 g  17 g Oral Daily PRN Oliverio Friedman MD        acetaminophen (TYLENOL) tablet 650 mg  650 mg Oral Q6H PRN Oliverio Friedman MD   650 mg at 03/07/23 2106    Or    acetaminophen (TYLENOL) suppository 650 mg  650 mg Rectal Q6H PRN Oliverio Friedman MD        aluminum & magnesium hydroxide-simethicone (MAALOX) 200-200-20 MG/5ML suspension 30 mL  30 mL Oral Q6H PRN Oliverio Friedman MD            Allergies:      No Known Allergies     Physical Examination  Vitals   Vitals:    03/09/23 0011 03/09/23 0745 03/09/23 2000 03/10/23 0530   BP:  138/60 121/73    Pulse:  65 80    Resp:  16 16    Temp:  97.3 °F (36.3 °C) 97.7 °F (36.5 °C)    TempSrc:  Temporal Temporal    SpO2:  98% 97%    Weight: 183 lb 1 oz (83 kg)   197 lb 1.6 oz (89.4 kg)   Height:            General: Patient appears in no acute distress. HEENT: Normocephalic, atraumatic  Chest: Clear to auscultation bilaterally  Heart: No murmurs appreciated  Extremities/Peripheral vascular: No edema/swelling noted. Neurologic Examination    Mental Status  Alert, and oriented to person, but not place [facility]), or time (Month and year) Speech is fluent with intact comprehension. Evidence of memory impairment due to preexisting neurocognitive dysfunction. Attention and concentration appeared abnormal.     Cranial Nerves  II.  Visual fields full to confrontation bilaterally. III, IV, VI: Pupils equally round and reactive to light, 3 to 2 mm bilaterally. EOMs: full, no nystagmus. V. Facial sensation intact to light touch bilaterally  VII: Facial movements symmetric and strong  VIII: Hearing intact to voice  IX,X: Palate elevates symmetrically. No dysarthria  XI:  trapezius 5/5 bilaterally   XII: Tongue is midline    Motor     Right Left   Right Left   Deltoid 5 5  Hip Flexion     Biceps 5 5  Knee Extension     Triceps 5 5  Knee Flexion     Handgrip 5 5  Ankle Dorsiflexion         Ankle Plantarflexion       Tone: Normal in all four limbs    Sensation  Light Touch: diminished from knees down bilaterally  Pinprick: diminished from ankle down on R, From just below the ankle down on the L  Vibration: diminished from knees down bilaterally         Coordination    Finger to nose testing normal bilaterally      Gait    Deferred for safety/fall consideration      Labs  Recent Labs     03/08/23  0738 03/09/23  1057 03/10/23  0436      < > 139   K 3.4*   < > 3.9      < > 109*   CO2 22   < > 22   BUN 32*   < > 19   CREATININE 0.7   < > 0.6*   GLUCOSE 105*   < > 113*   CALCIUM 8.0*   < > 7.8*   PROT 5.0*   < > 4.7*   LABALBU 2.6*   < > 2.4*   BILITOT 0.9   < > 0.4   ALKPHOS 63   < > 67   *   < > 54*   ALT 64*   < > 59*   WBC 7.7  --   --    RBC 4.36  --   --    HGB 13.7  --   --    HCT 41.8  --   --    MCV 95.9  --   --    MCH 31.4  --   --    MCHC 32.8  --   --    RDW 12.9  --   --      --   --    MPV 10.7  --   --     < > = values in this interval not displayed. Imaging  CT HEAD WO CONTRAST   Final Result   CT HEAD WITHOUT CONTRAST:      1. No skull fracture or acute intracranial abnormality. CT CERVICAL SPINE WITHOUT CONTRAST:      1. No fracture or joint dislocation is seen. 2. Degenerative changes, as described. CT CERVICAL SPINE WO CONTRAST   Final Result   CT HEAD WITHOUT CONTRAST:      1.   No skull fracture or acute intracranial abnormality. CT CERVICAL SPINE WITHOUT CONTRAST:      1. No fracture or joint dislocation is seen. 2. Degenerative changes, as described. CT PELVIS WO CONTRAST Additional Contrast? None   Final Result   1. No acute abnormality seen in the pelvis. 2. Bilateral hip arthroplasties noted. 3. Innumerable diverticula in the urinary bladder. The bladder base and   prostate are obscured by beam hardening artifact from bilateral hip   arthroplasties. 4. Prominent distal colonic diverticulosis without diverticulitis. XR CHEST PORTABLE   Final Result   No acute process. XR HIP 2-3 VW W PELVIS RIGHT   Final Result   No acute bony abnormalities or prosthetic complications.                I have personally reviewed the following images: CT Head, CT C spine           Jn Morillo MS3  Attending Physician: Dr. David Pimentel

## 2023-03-10 NOTE — DISCHARGE SUMMARY
Internal Medicine Discharge Summary    Patient ID: Tobin Noble      Patient's PCP: Amelia Ro MD    Admit Date: 3/6/2023     Discharge Date:   03/10/2023    Admitting Physician: Mary Mckeon MD     Discharge Physician: Abena Medrano MD     Discharge Diagnoses:  Rhabdomyolysis  Status post fall  Transaminitis  Altered mental status         The patient was seen and examined on day of discharge and this discharge summary is in conjunction with any daily progress note from day of discharge. Hospital Course:   60-year-old male with suspected dementia presented to the ER status post fall. Patient fell overnight and was on the ground for up to about 12 hours. Patient was found at 12 PM noon on the floor. Patient lives alone. Patient denied having any chills, chest pain, nausea, vomiting, shortness of breath on presentation. In the ER patient is found to be hemodynamically stable. Patient's CK was elevated at 4446. And also had mild transaminitis. CT head was found to be negative, CT cervical spine did not show any fracture, CT pelvis bilateral hip arthroplasties were noted. Chest x-ray was found to be negative. X-ray right pelvis was found to be negative. Patient was started on out of the lines of fluids. CK was found to be downtrending over his course. Allergy was consulted due to slight confusion as per family. Patient was cleared by neurology. Patient otherwise participated in physical therapy to regain his strength. Patient is clinically stable for discharge. Exam:     /66   Pulse 77   Temp 98.2 °F (36.8 °C) (Temporal)   Resp 16   Ht 6' 2\" (1.88 m)   Wt 197 lb 1.6 oz (89.4 kg)   SpO2 93%   BMI 25.31 kg/m²     General appearance: No apparent distress, appears stated age and cooperative. HEENT: Pupils equal, round, and reactive to light. Conjunctivae/corneas clear. Neck: Supple, with full range of motion. No jugular venous distention. Trachea midline.   Respiratory: Normal respiratory effort. Clear to auscultation, bilaterally without Rales/Wheezes/Rhonchi. Cardiovascular: Regular rate and rhythm with normal S1/S2 without murmurs, rubs or gallops. Abdomen: Soft, non-tender, non-distended with normal bowel sounds. Musculoskeletal: No clubbing, cyanosis or edema bilaterally. Full range of motion without deformity. Skin: Skin color, texture, turgor normal.  No rashes or lesions. Neurologic:  Neurovascularly intact without any focal sensory/motor deficits. Cranial nerves: II-XII intact, grossly non-focal.  Psychiatric: Alert and oriented, thought content appropriate, normal insight      Consults:     IP CONSULT TO INTERNAL MEDICINE  IP CONSULT TO NEUROLOGY    Disposition:  DESTINY    Discharge Instructions/Follow-up:     Please follow up with your PCP within one to two weeks of discharge. Code Status:  Full Code    Activity: activity as tolerated    Diet: regular diet    Labs: For convenience and continuity at follow-up the following most recent labs are provided:      CBC:    Lab Results   Component Value Date/Time    WBC 7.7 03/08/2023 07:38 AM    HGB 13.7 03/08/2023 07:38 AM    HCT 41.8 03/08/2023 07:38 AM     03/08/2023 07:38 AM       Renal:    Lab Results   Component Value Date/Time     03/10/2023 04:36 AM    K 3.9 03/10/2023 04:36 AM     03/10/2023 04:36 AM    CO2 22 03/10/2023 04:36 AM    BUN 19 03/10/2023 04:36 AM    CREATININE 0.6 03/10/2023 04:36 AM    CALCIUM 7.8 03/10/2023 04:36 AM    PHOS 3.0 03/10/2023 04:36 AM       Discharge Medications: There are no discharge medications for this patient. Time Spent on discharge is more than 30 minutes in the examination, evaluation, counseling and review of medications and discharge plan.       Signed:    Hailey Mahan MD   3/10/2023

## 2023-03-10 NOTE — DISCHARGE INSTRUCTIONS
Please follow up with your PCP within one to two weeks of discharge. Please do not hesitate to return to the ER if symptoms worsen.

## 2023-03-11 LAB
ACETAMINOPHEN LEVEL: <5 MCG/ML (ref 10–30)
AMPHETAMINE SCREEN, URINE: NOT DETECTED
BARBITURATE SCREEN URINE: NOT DETECTED
BENZODIAZEPINE SCREEN, URINE: NOT DETECTED
CANNABINOID SCREEN URINE: NOT DETECTED
COCAINE METABOLITE SCREEN URINE: NOT DETECTED
ETHANOL: <10 MG/DL (ref 0–0.08)
FENTANYL SCREEN, URINE: NOT DETECTED
INFLUENZA A: NOT DETECTED
INFLUENZA B: NOT DETECTED
Lab: NORMAL
METHADONE SCREEN, URINE: NOT DETECTED
OPIATE SCREEN URINE: NOT DETECTED
OXYCODONE URINE: NOT DETECTED
PHENCYCLIDINE SCREEN URINE: NOT DETECTED
SALICYLATE, SERUM: <0.3 MG/DL (ref 0–30)
SARS-COV-2 RNA, RT PCR: NOT DETECTED
TRICYCLIC ANTIDEPRESSANTS SCREEN SERUM: NEGATIVE NG/ML

## 2023-03-11 PROCEDURE — 87636 SARSCOV2 & INF A&B AMP PRB: CPT

## 2023-03-11 PROCEDURE — 82077 ASSAY SPEC XCP UR&BREATH IA: CPT

## 2023-03-11 PROCEDURE — 80143 DRUG ASSAY ACETAMINOPHEN: CPT

## 2023-03-11 PROCEDURE — 80179 DRUG ASSAY SALICYLATE: CPT

## 2023-03-11 PROCEDURE — 6360000002 HC RX W HCPCS

## 2023-03-11 PROCEDURE — 80307 DRUG TEST PRSMV CHEM ANLYZR: CPT

## 2023-03-11 PROCEDURE — 6360000002 HC RX W HCPCS: Performed by: EMERGENCY MEDICINE

## 2023-03-11 RX ORDER — HALOPERIDOL 5 MG/ML
5 INJECTION INTRAMUSCULAR ONCE
Status: COMPLETED | OUTPATIENT
Start: 2023-03-11 | End: 2023-03-11

## 2023-03-11 RX ORDER — HALOPERIDOL 5 MG/ML
INJECTION INTRAMUSCULAR
Status: COMPLETED
Start: 2023-03-11 | End: 2023-03-11

## 2023-03-11 RX ADMIN — HALOPERIDOL LACTATE 2.5 MG: 5 INJECTION, SOLUTION INTRAMUSCULAR at 02:35

## 2023-03-11 RX ADMIN — HALOPERIDOL LACTATE 2.5 MG: 5 INJECTION, SOLUTION INTRAMUSCULAR at 17:09

## 2023-03-11 ASSESSMENT — LIFESTYLE VARIABLES: HOW OFTEN DO YOU HAVE A DRINK CONTAINING ALCOHOL: NEVER

## 2023-03-11 NOTE — ED NOTES
Spoke with daughter who requested update as specifics of why pt was declined admission to [facility]. Explained that, per note by Irma Roland, Dr Alexandria Del Valle feels we are not able to properly accommodate pt on [facility] as he will benefit from a geriatric unit that has medical beds. Daughter requests that pt referred to hospital that has physical therapy. Explained that free-standing psych units do not have physical therapy, will have to consider a full hospital with both psych and physical therapy services. SW will relay request to [facility]. Call to [facility], spoke with Maggi who reports [facility] is at capacity, [facility] doesn't accept pt insurance. Maggi reports she will try [facility] but pt may have to go to a free standing psychiatric facility due to it being weekend and often multiple hospital units are at capacity on weekends. Relayed info to aurelio peralta.       [address] [facility], [facility] [place], [place]  03/11/23 5351

## 2023-03-11 NOTE — ED NOTES
Spoke with Holley in [address] r/t pts poc. Family updated on poc.  Case will be presented upstairs once bed is available     Michael Win RN  03/11/23 1200

## 2023-03-11 NOTE — ED NOTES
Patient became verbally aggressive and violent with staff at bedside while tying to make patient comfortable in bed. Patient stated people were stealing from him and to get off of his property.       Henry Kraus RN  03/11/23 [address], RN  03/11/23 0007

## 2023-03-11 NOTE — ED NOTES
Per [place]  the pt was declined due to needing a ambreen- psych bed. Pt is [facility].      [facility]  03/11/23 [address] [facility], [facility]  03/11/23 1541

## 2023-03-11 NOTE — ED NOTES
SW notified family would like an update. Acknowledged understanding.       Brock Calderon RN  03/11/23 0195

## 2023-03-11 NOTE — ED PROVIDER NOTES
HPI:  3/10/23, Time: 7:53 PM MAYDA Melgar is a 80 y.o. male presenting to the ED for psychiatric evaluation, beginning 1 day ago. The complaint has been persistent, moderate in severity, and worsened by nothing. Patient was recently discharged from the hospital.  Patient just arrived at nursing home and became aggressive with staff and reportedly threatening. Patient denying any complaints of chest pain shortness of breath or abdominal pain. Patient was just recently admitted for rhabdomyolysis. Patient unable to give history he is only oriented to person only. ROS:   Pertinent positives and negatives are stated within HPI, all other systems reviewed and are negative.  --------------------------------------------- PAST HISTORY ---------------------------------------------  Past Medical History:  has no past medical history on file. Past Surgical History:  has a past surgical history that includes Cardiac surgery; joint replacement; and Total hip arthroplasty. Social History:  reports that he has never smoked. He does not have any smokeless tobacco history on file. He reports that he does not drink alcohol. Family History: family history is not on file. The patients home medications have been reviewed. Allergies: Patient has no known allergies. ---------------------------------------------------PHYSICAL EXAM--------------------------------------    Constitutional/General: Alert and oriented to person only  Head: Normocephalic and atraumatic  Eyes: PERRL, EOMI  Mouth: Oropharynx clear, handling secretions, no trismus  Neck: Supple, full ROM, non tender to palpation in the midline, no stridor, no crepitus, no meningeal signs  Pulmonary: Lungs clear to auscultation bilaterally, no wheezes, rales, or rhonchi. Not in respiratory distress  Cardiovascular:  Regular rate. Regular rhythm. No murmurs, gallops, or rubs. 2+ distal pulses  Chest: no chest wall tenderness  Abdomen: Soft. Non tender. Non distended. +BS. No rebound, guarding, or rigidity. No pulsatile masses appreciated. Musculoskeletal: Moves all extremities x 4. Warm and well perfused, no clubbing, cyanosis, or edema. Capillary refill <3 seconds  Skin: warm and dry. No rashes. Neurologic: Oriented to person only. Patient able to move upper extremities limited range of motion of lower extremity  Psych: Unable to fully assess but patient reportedly was aggressive with staff and threatening staff    -------------------------------------------------- RESULTS -------------------------------------------------  I have personally reviewed all laboratory and imaging results for this patient. Results are listed below.      LABS:  Results for orders placed or performed during the hospital encounter of 03/10/23   CBC with Auto Differential   Result Value Ref Range    WBC 8.5 4.5 - 11.5 E9/L    RBC 4.85 3.80 - 5.80 E12/L    Hemoglobin 15.3 12.5 - 16.5 g/dL    Hematocrit 46.2 37.0 - 54.0 %    MCV 95.3 80.0 - 99.9 fL    MCH 31.5 26.0 - 35.0 pg    MCHC 33.1 32.0 - 34.5 %    RDW 13.0 11.5 - 15.0 fL    Platelets 820 653 - 972 E9/L    MPV 10.9 7.0 - 12.0 fL    Neutrophils % 73.8 43.0 - 80.0 %    Immature Granulocytes % 0.9 0.0 - 5.0 %    Lymphocytes % 13.4 (L) 20.0 - 42.0 %    Monocytes % 8.7 2.0 - 12.0 %    Eosinophils % 2.8 0.0 - 6.0 %    Basophils % 0.4 0.0 - 2.0 %    Neutrophils Absolute 6.30 1.80 - 7.30 E9/L    Immature Granulocytes # 0.08 E9/L    Lymphocytes Absolute 1.14 (L) 1.50 - 4.00 E9/L    Monocytes Absolute 0.74 0.10 - 0.95 E9/L    Eosinophils Absolute 0.24 0.05 - 0.50 E9/L    Basophils Absolute 0.03 0.00 - 0.20 E9/L   Comprehensive Metabolic Panel   Result Value Ref Range    Sodium 141 132 - 146 mmol/L    Potassium 4.3 3.5 - 5.0 mmol/L    Chloride 106 98 - 107 mmol/L    CO2 26 22 - 29 mmol/L    Anion Gap 9 7 - 16 mmol/L    Glucose 129 (H) 74 - 99 mg/dL    BUN 20 6 - 23 mg/dL    Creatinine 0.8 0.7 - 1.2 mg/dL    Est, Glom Filt Rate >60 >=60 mL/min/1.73    Calcium 9.1 8.6 - 10.2 mg/dL    Total Protein 6.5 6.4 - 8.3 g/dL    Albumin 3.4 (L) 3.5 - 5.2 g/dL    Total Bilirubin 0.5 0.0 - 1.2 mg/dL    Alkaline Phosphatase 94 40 - 129 U/L    ALT 79 (H) 0 - 40 U/L    AST 69 (H) 0 - 39 U/L   Urinalysis   Result Value Ref Range    Color, UA Yellow Straw/Yellow    Clarity, UA Clear Clear    Glucose, Ur Negative Negative mg/dL    Bilirubin Urine Negative Negative    Ketones, Urine Negative Negative mg/dL    Specific Gravity, UA 1.025 1.005 - 1.030    Blood, Urine Negative Negative    pH, UA 6.0 5.0 - 9.0    Protein, UA Negative Negative mg/dL    Urobilinogen, Urine 0.2 <2.0 E.U./dL    Nitrite, Urine Negative Negative    Leukocyte Esterase, Urine Negative Negative   URINE DRUG SCREEN   Result Value Ref Range    Drug Screen Comment: see below    EKG 12 Lead   Result Value Ref Range    Ventricular Rate 69 BPM    Atrial Rate 69 BPM    P-R Interval 170 ms    QRS Duration 108 ms    Q-T Interval 408 ms    QTc Calculation (Bazett) 437 ms    P Axis 11 degrees    R Axis 43 degrees    T Axis 100 degrees       RADIOLOGY:  Interpreted by Radiologist.  No orders to display         EKG: This EKG is signed and interpreted by me. Rate: 69  Rhythm: Sinus  Interpretation: non-specific EKG  Comparison: stable as compared to patient's most recent EKG 3/6/23      ------------------------- NURSING NOTES AND VITALS REVIEWED ---------------------------   The nursing notes within the ED encounter and vital signs as below have been reviewed by myself. BP (!) 169/74   Pulse 78   Temp 97.9 °F (36.6 °C)   Resp 19   SpO2 96%   Oxygen Saturation Interpretation: Normal    The patients available past medical records and past encounters were reviewed.         ------------------------------ ED COURSE/MEDICAL DECISION MAKING----------------------  Medications   haloperidol lactate (HALDOL) 5 MG/ML injection (has no administration in time range)             Medical Decision Making: History From: Patient with history of underlying dementia presenting here because of reportedly threatening staff and being aggressive with family. .  Patient oriented to person only. Patient unable to give complete history. Patient having no chest pain no difficulty breathing there is no abdominal pain. Patient has had no recent falls. Patient reporting no headache. CC/HPI Summary, DDx, ED Course, Reassessment, Tests Considered, Patient expectation: Patient with history of dementia and rhabdomyolysis as well as cystitis presenting here because of being aggressive with staff. Patient reportedly being aggressive and threatening. Patient only oriented to person here. There is no history of any fall today there is no history of vomiting or diarrhea there is no history of abdominal pain. Patient having no fever. Patient on arrival here is oriented to person only. Patient heart and lung exam normal abdomen soft nontender he has no rebound. He is able to move upper extremities limited range of motion of lower extremities. Differential diagnosis includes psychosis as well as mood disorder as well as infectious process such as UTI and pneumonia. Patient did undergo lab work CBC was normal hemoglobin was 15.3 white count was 8.5 electrolytes potassium was 4.3 BUN is 20 creatinine 0.8 urinalysis nitrite and leukocyte esterase negative. Patient while here in the emergency room became agitated was given Haldol 2.5 mg IM. Family was at bedside.  did evaluate patient I did order serum and urine drug screen for behavioral admission. COVID testing was also ordered. Plan will be for  to evaluate and assist with disposition        Social Determinants affecting Dx or Tx: Patient does not smoke    Chronic Conditions: Patient was just admitted for fall and reportedly had rhabdomyolysis he had elevated CK as well as transaminitis he had CTs done which were negative.   He had plain x-rays as well. Records Reviewed: I did review record patient was admitted recently for rhabdomyolysis as well as cystitis patient does have a history of dementia. Re-Evaluations:             Re-evaluation. Patients symptoms show no change  While here in the emergency room became agitated was given Haldol 2.5 mg IM. Vital signs stable. Consultations:                 Critical Care: This patient's ED course included: a personal history and physicial eaxmination    This patient has been closely monitored during their ED course. Counseling: The emergency provider has spoken with the  and discussed todays results, in addition to providing specific details for the plan of care and counseling regarding the diagnosis and prognosis. Questions are answered at this time and they are agreeable with the plan.       --------------------------------- IMPRESSION AND DISPOSITION ---------------------------------    IMPRESSION  1. Mood disorder ([address]    2. History of dementia        DISPOSITION  Disposition:  to assist with disposition  Patient condition is stable        NOTE: This report was transcribed using voice recognition software.  Every effort was made to ensure accuracy; however, inadvertent computerized transcription errors may be present          Ria Knox MD  03/10/23 47423 JOSE CARLOS Victor MD  03/11/23 8746

## 2023-03-11 NOTE — ED NOTES
Pt sitting up in bed, tolerating meal tray. Skin pwd. Respirations equal and non labored. Pt calm and cooperative at this time.       Tara Flaherty RN  03/11/23 0803

## 2023-03-11 NOTE — ED NOTES
Discussed case with Dr Portillo/ Steve Tipton, pt denied admission to [address] [place] [address] d/t unable to properly accommodate pt. He will benefit from a geriatric psych unit that has medical beds. Please refer pt to such a facility. Tuyet Yenr aware of this entry and to inform primary care rn.        Byron Norton, LAURA  03/11/23 5186

## 2023-03-11 NOTE — ED NOTES
Patient cleaned of incontinence. Kiara care provided and linens changed      Caity Ahumada RN  03/11/23 6562

## 2023-03-11 NOTE — ED NOTES
Spoke with Dr. Renu Islas r/t pts increase in agitation. See new orders.       Lars Langford RN  03/11/23 9238

## 2023-03-11 NOTE — ED NOTES
Behavioral Health Crisis Assessment      Chief Complaint: Patient sent in from [address] due to patient becoming aggressive and suicidal    Mental Status Exam: Patient is alert and oriented to self, restricted affect, mood is agitated, angry, aggressive,uncooperative, poor historian, Speech is pressured/rapid, hygiene is fair, Eye contact is intense, Thought process is impaired by dementia     Legal Status:  [] Voluntary:  [x] Involuntary, Issued by: ED Doc    Gender:  [x] Male [] Female [] Transgender  [] Other    Sexual Orientation:  [x] Heterosexual [] Homosexual [] Bisexual [] Other    Brief Clinical Summary: Patient sent to ED from [facility] for agitation, patient squeezed his daughters arm and asked for a gun twice per daughter who was with patient when all of this happened. [facility] nurse states that patient has no PRN medications ordered because he just arrived to the facility today. Patient was recently d/c from hospital with a hx of rhabdomyolysis. Patient compliant in triage and denies HI and SI     [facility] SW met with patient and his daughter Bette Wagner who is at bedside with patient to complete a biopsychosocial assessment on patient. Patient is a 79 yo male who is  and up until this past Sunday 03/05/2023 was living alone and independently in his home according to his daughter. Patient was discharged from this facility on 03/10/2023 and discharged to a rehab facility  Patient was recently diagnoses with dementia and since his admission on 03/06/2023, he has been having aggressive behaviors, grabbing arms and trying to leave and was not put on any medications for his dementia or behaviors prior to leaving hospital for Rehab facility. Patient has a recent dx of dementia, not on any medications, not connected with outpatient mental health services. Collateral Information: Daughter Bette Wagner gave all the information above as patient was very altered.  While in the room with patient he did become agitated again. Patient grabbed daughters arm very hard, [facility] SW assisted daughter in getting patient to let go, patient then grabbed this SW arm and started saying \"just shooting me, you may as well kill me know since that is what you are going to do anyway's\" Patient believes he is at home and hospital staff are stealing from him. Patient's daughter attempted to help calm him down and then patient believed she was against him so patient told her to leave and never come back. [facility] SW offered comfort to daughter and explained that was the dementia talking and that she should step out as to not be apart of this. Daughter decided to go home. Charge RN notified that patient is to be pink slipped and a CO was needed. Risk Factors:    Hx of Trauma: patient fell and hit head on 03/05/2023  Lack of self care  Undiagnosed MH relating to recent dementia dx  Poor communication skills  Gender age risk for males  Environmental stressors    Protective Factors:    Strong family support: 3 daughter and a sister  Safe and stable housing  Has access to essential needs  Steady income per daughter  No access to weapons    Suicidal Ideations:  [x] Reports:    [] Past [x] Present   [] Denies    Suicide Attempts:  [] Reports:   [x] Denies    C-SSRS Screening Completed by RN: Current Suicide Risk:  [x] No Risk [] Low [] Moderate [] High    Homicidal Ideations:  [] Reports:   [] Past [] Present   [x] Denies     Self Injurious/Self Mutilation Behaviors:  [] Reports:    [] Past [] Present   [x] Denies    Hallucinations/Delusions: Unknown  [] Reports:   [] Denies     Substance Use/Alcohol Use/Addiction:  [] Reports:   [x] Denies   [x] SBIRT Screen Complete.      Current or Past Substance Abuse Treatment:  [] Yes, When and Where:  [x] No    Current or Past Mental Health Treatment:  [] Yes, When and Where:  [x] No    Legal Issues:  []  Yes (Specify)  [x]  No    Access to Weapons:  []  Yes (Specify)  [x]  No    Trauma History:  [x] Reports: fell and hit head on 03/05/2023/ was on floor for 16 hrs before someone found him. [] Denies     Living Situation: Currently at rehab/ daughter is looking for a memory care unit for him. Employment: Retired    Education Level: Unknown    Patient was not violent prior to falling and hitting head on 03/05/2023, since then patient was diagnosed with dementia (family knew he had dementia before this it was not dx and it wasn't that bad yet)    Violence Risk Screening:        Have you ever thought about hurting someone? [x]  No  []  Yes (Ask the questions listed below)   When? Did you follow through with the thoughts? [] No     [] Yes- When and what happened? 2.  Have you ever threatened anyone? [x]  No  []  Yes (Ask the questions listed below)   When and what happened? Have you ever threatened someone with a gun, knife or other weapon? []  No  []  Yes - When and what happened? 2. Have you ever had an order of protection taken out against you? []  Yes [x]  No  3. Have you ever been arrested due to violence? []  Yes [x]  No  4. Have you ever been cruel to animals? []  Yes [x]  No    After consideration of C-SSRS screening results, C-SSRS assessments, and this professional's assessment the patient's overall suicide risk assessed to be:  [] No Risk  [] Low   [x] Moderate   [] High     [x] Discussed current suicide risk, protective and risk factors with RN and ED Physician     Disposition:  [] Home:   [] Outpatient Provider:   [] Crisis Unit:   [x] Inpatient Psychiatric Unit:  [] Other:       [facility] SW will pursue inpatient admission. Patient's daughter is requesting that patient stay here at this facility as she feels patient might do better at a hospital he is more familiar with plus it is close for family as most of them are older.              Ward Ramsey, [place], [place]  03/11/23 8004

## 2023-03-12 ENCOUNTER — APPOINTMENT (OUTPATIENT)
Dept: GENERAL RADIOLOGY | Age: 88
End: 2023-03-12
Payer: MEDICARE

## 2023-03-12 LAB
EKG ATRIAL RATE: 69 BPM
EKG P AXIS: 11 DEGREES
EKG P-R INTERVAL: 170 MS
EKG Q-T INTERVAL: 408 MS
EKG QRS DURATION: 108 MS
EKG QTC CALCULATION (BAZETT): 437 MS
EKG R AXIS: 43 DEGREES
EKG T AXIS: 100 DEGREES
EKG VENTRICULAR RATE: 69 BPM

## 2023-03-12 PROCEDURE — 6360000002 HC RX W HCPCS: Performed by: EMERGENCY MEDICINE

## 2023-03-12 PROCEDURE — 93010 ELECTROCARDIOGRAM REPORT: CPT | Performed by: INTERNAL MEDICINE

## 2023-03-12 PROCEDURE — 71045 X-RAY EXAM CHEST 1 VIEW: CPT

## 2023-03-12 RX ORDER — HALOPERIDOL 5 MG/ML
5 INJECTION INTRAMUSCULAR ONCE
Status: DISCONTINUED | OUTPATIENT
Start: 2023-03-12 | End: 2023-03-13 | Stop reason: HOSPADM

## 2023-03-12 NOTE — ED NOTES
Assumed care of patient. Patient resting comfortably. Daughter and sitter at bedside. Patient denies complaints at this time.      Charley Torres RN  03/12/23 9505

## 2023-03-12 NOTE — ED NOTES
Spoke with RACHANA for update on plan of care, states they are looking at [facility] for potential admission tomorrow, all other facilities are full     Benigno Quiroz RN  03/12/23 9810

## 2023-03-12 NOTE — ED NOTES
Capacity:  [facility]- closed d/t COVID  [facility]     Declined:  [facility]      Out of network:  [facility]-  no beds available but okay to fax packet. Packet faxed by [employer].       STAS Thomas  03/12/23 4345

## 2023-03-12 NOTE — ED NOTES
[facility] called and stated patient has been excepted there. RN states that they need a chest X in order to finish medically clearing him. MD notified.      Jossy Vallejo RN  03/12/23 1900

## 2023-03-12 NOTE — ED NOTES
Patient resting in bed at this time, with sitter. Patient has call jones in reach.       Chelsea Hutton RN  03/12/23 1924

## 2023-03-13 VITALS
RESPIRATION RATE: 20 BRPM | DIASTOLIC BLOOD PRESSURE: 72 MMHG | HEART RATE: 97 BPM | OXYGEN SATURATION: 96 % | TEMPERATURE: 97.8 F | SYSTOLIC BLOOD PRESSURE: 156 MMHG

## 2023-03-13 LAB
ALBUMIN SERPL-MCNC: 1.8 G/DL (ref 3.5–4.7)
ALPHA-1-GLOBULIN: 0.5 G/DL (ref 0.2–0.4)
ALPHA-2-GLOBULIN: 1.1 G/DL (ref 0.5–1)
BETA GLOBULIN: 1.3 G/DL (ref 0.8–1.3)
ELECTROPHORESIS: ABNORMAL
GAMMA GLOBULIN: 0.9 G/DL (ref 0.7–1.6)
IMMUNOFIXATION RESULT, SERUM: NORMAL
TOTAL PROTEIN: 5.5 G/DL (ref 6.4–8.3)

## 2023-03-13 NOTE — ED NOTES
Per Anival Thakkar at [facility], pt accepted by Dr Dedrick Pickens to [facility] from [facility] is setting up transport. [facility] number is [phone]. SW will notify ED nurse as soon as transport set up.       [address] [place] [address], [facility] [place]  03/12/23 2026

## 2023-03-13 NOTE — ED NOTES
Patients power of  packet from daughter taken and copied and in hard chart. Original forms given back to family. Patients daughter states she does not plan to stay at bedside until transport arrives. Patients sitter remains at bedside. Patient shows no signs of distress. Respirations even and non labored.  Call bell in reach     Los dandy, RN  03/12/23 4569

## 2023-03-13 NOTE — ED NOTES
Patients daughter at the bedside aware of patients plan of care.  Patients daughter request that patient is not told he will be leaving the ED until he is being transported due to aggressive behavior      Meg Burnett RN  03/12/23 2044

## 2023-03-13 NOTE — ED NOTES
Nurse to nurse given to Michael Edwards at [facility] unit     [facility]  03/12/23 2030

## 2023-03-13 NOTE — ED NOTES
Attempted to call [facility] to given ETA, no answer.  Voice mail left      Jena Higgins [facility]  03/12/23 2053